# Patient Record
Sex: MALE | Race: WHITE | NOT HISPANIC OR LATINO
[De-identification: names, ages, dates, MRNs, and addresses within clinical notes are randomized per-mention and may not be internally consistent; named-entity substitution may affect disease eponyms.]

---

## 2018-03-26 PROBLEM — Z00.00 ENCOUNTER FOR PREVENTIVE HEALTH EXAMINATION: Status: ACTIVE | Noted: 2018-03-26

## 2018-04-02 ENCOUNTER — APPOINTMENT (OUTPATIENT)
Dept: CARDIOLOGY | Facility: CLINIC | Age: 78
End: 2018-04-02

## 2018-04-25 ENCOUNTER — APPOINTMENT (OUTPATIENT)
Dept: CARDIOLOGY | Facility: CLINIC | Age: 78
End: 2018-04-25

## 2018-04-25 VITALS
SYSTOLIC BLOOD PRESSURE: 143 MMHG | OXYGEN SATURATION: 98 % | BODY MASS INDEX: 26.51 KG/M2 | HEIGHT: 73 IN | HEART RATE: 76 BPM | DIASTOLIC BLOOD PRESSURE: 60 MMHG | TEMPERATURE: 98.4 F | WEIGHT: 200 LBS

## 2018-04-25 DIAGNOSIS — Z86.39 PERSONAL HISTORY OF OTHER ENDOCRINE, NUTRITIONAL AND METABOLIC DISEASE: ICD-10-CM

## 2018-04-25 DIAGNOSIS — Z87.19 PERSONAL HISTORY OF OTHER DISEASES OF THE DIGESTIVE SYSTEM: ICD-10-CM

## 2018-04-25 DIAGNOSIS — M54.12 RADICULOPATHY, CERVICAL REGION: ICD-10-CM

## 2018-04-25 DIAGNOSIS — Z87.09 PERSONAL HISTORY OF OTHER DISEASES OF THE RESPIRATORY SYSTEM: ICD-10-CM

## 2018-04-25 DIAGNOSIS — Z86.79 PERSONAL HISTORY OF OTHER DISEASES OF THE CIRCULATORY SYSTEM: ICD-10-CM

## 2018-04-25 DIAGNOSIS — Z87.438 PERSONAL HISTORY OF OTHER DISEASES OF MALE GENITAL ORGANS: ICD-10-CM

## 2018-04-25 RX ORDER — MULTIVITAMIN
TABLET ORAL
Refills: 0 | Status: ACTIVE | COMMUNITY

## 2018-04-25 RX ORDER — METFORMIN HYDROCHLORIDE 1000 MG/1
1000 TABLET, COATED ORAL TWICE DAILY
Refills: 0 | Status: ACTIVE | COMMUNITY
Start: 2018-04-10

## 2018-04-25 RX ORDER — PNV NO.95/FERROUS FUM/FOLIC AC 28MG-0.8MG
TABLET ORAL
Refills: 0 | Status: ACTIVE | COMMUNITY

## 2018-04-25 RX ORDER — EZETIMIBE 10 MG/1
10 TABLET ORAL
Qty: 90 | Refills: 0 | Status: ACTIVE | COMMUNITY
Start: 2018-01-11

## 2018-05-07 ENCOUNTER — NON-APPOINTMENT (OUTPATIENT)
Age: 78
End: 2018-05-07

## 2018-05-07 PROBLEM — M54.12 CERVICAL RADICULOPATHY: Status: RESOLVED | Noted: 2018-05-07 | Resolved: 2018-05-07

## 2018-05-07 PROBLEM — Z87.09 HISTORY OF ASBESTOSIS: Status: RESOLVED | Noted: 2018-05-07 | Resolved: 2018-05-07

## 2018-05-07 PROBLEM — Z87.19 HISTORY OF DIVERTICULITIS OF COLON: Status: RESOLVED | Noted: 2018-05-07 | Resolved: 2018-05-07

## 2018-05-07 PROBLEM — Z87.438 HISTORY OF ERECTILE DYSFUNCTION: Status: RESOLVED | Noted: 2018-05-07 | Resolved: 2018-05-07

## 2018-05-07 PROBLEM — Z86.79 HISTORY OF CAROTID ARTERY STENOSIS: Status: RESOLVED | Noted: 2018-05-07 | Resolved: 2018-05-07

## 2018-05-07 PROBLEM — Z86.39 HISTORY OF TYPE 2 DIABETES MELLITUS: Status: RESOLVED | Noted: 2018-05-07 | Resolved: 2018-05-07

## 2018-05-07 PROBLEM — Z86.39 HISTORY OF HYPERLIPIDEMIA: Status: RESOLVED | Noted: 2018-05-07 | Resolved: 2018-05-07

## 2018-05-07 PROBLEM — Z86.39 HISTORY OF HYPERKALEMIA: Status: RESOLVED | Noted: 2018-05-07 | Resolved: 2018-05-07

## 2018-05-22 ENCOUNTER — APPOINTMENT (OUTPATIENT)
Dept: CARDIOLOGY | Facility: CLINIC | Age: 78
End: 2018-05-22

## 2018-11-07 ENCOUNTER — RECORD ABSTRACTING (OUTPATIENT)
Age: 78
End: 2018-11-07

## 2018-11-19 ENCOUNTER — RECORD ABSTRACTING (OUTPATIENT)
Age: 78
End: 2018-11-19

## 2018-12-10 ENCOUNTER — APPOINTMENT (OUTPATIENT)
Dept: CARDIOLOGY | Facility: CLINIC | Age: 78
End: 2018-12-10
Payer: MEDICARE

## 2018-12-10 VITALS
SYSTOLIC BLOOD PRESSURE: 148 MMHG | TEMPERATURE: 97.8 F | WEIGHT: 187 LBS | BODY MASS INDEX: 24.78 KG/M2 | DIASTOLIC BLOOD PRESSURE: 56 MMHG | OXYGEN SATURATION: 99 % | HEIGHT: 73 IN | HEART RATE: 48 BPM

## 2018-12-10 DIAGNOSIS — Z86.19 PERSONAL HISTORY OF OTHER INFECTIOUS AND PARASITIC DISEASES: ICD-10-CM

## 2018-12-10 PROCEDURE — 93000 ELECTROCARDIOGRAM COMPLETE: CPT

## 2018-12-10 PROCEDURE — 99215 OFFICE O/P EST HI 40 MIN: CPT

## 2018-12-15 ENCOUNTER — NON-APPOINTMENT (OUTPATIENT)
Age: 78
End: 2018-12-15

## 2018-12-15 PROBLEM — Z86.19 HISTORY OF LYME DISEASE: Status: RESOLVED | Noted: 2018-12-15 | Resolved: 2018-12-15

## 2018-12-15 NOTE — PHYSICAL EXAM
[Auscultation Breath Sounds / Voice Sounds] : lungs were clear to auscultation bilaterally [Heart Rate And Rhythm] : heart rate and rhythm were normal [Heart Sounds] : normal S1 and S2 [Murmurs] : no murmurs present [Abdomen Soft] : soft [Bowel Sounds] : normal bowel sounds [Abnormal Walk] : normal gait [Nail Clubbing] : no clubbing of the fingernails [] : no rash [Affect] : the affect was normal [FreeTextEntry1] : pleasant

## 2018-12-15 NOTE — DISCUSSION/SUMMARY
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 In 5/18 sinus bradycardia with Mobitz type I second-degree AV block was noted. The present tracing demonstrates atrial fibrillation with a controlled-slow ventricular response. As such, atrial fibrillation now may be chronic. Adequate rate control of ventricular response is achieved with the present medical regimen. An elevated "[id number]" score is indicative of an increased risk of systemic/cerebral emboli. Factor X A. inhibitor therapy is indicated. Permanent pacemaker implantation is not warranted in the absence of symptomatic bradycardia or high degree AV block. In view of the patient's age and left atrial enlargement (4.9 cm  5/18 echocardiogram) measures to try and restore and maintain sinus rhythm are unlikely to be successful. The goal of treatment is prevention of emboli and control of the ventricular response.\par \par I have recommended the following:\par 1. Continue present medical regimen\par 2. No further cardiac testing for this problem at this time\par 3. No attempt to restore/maintain sinus rhythm as discussed above\par \par \par Atherosclerotic heart disease\par Daniel has known atherosclerotic heart disease. In 9/07 he underwent coronary artery bypass graft surgery with a robotic-assisted LIMA-LAD. Recurrent symptoms and stenosis of the anastomosis of the LIMA-LAD led to a second operation in 11/07. The operation consisted of a  free LAURE-diagonal/LAD and  saphenous vein grafts to OM1 and OM 3. The right coronary artery was nonobstructive.  A5/18 stress sestamibi study demonstrated only a small area of mild partial anterior apical ischemia.. The resting 12/18 electrocardiogram shows no evidence of myocardial -ischemia or infarction. Left ventricle systolic function is normal as reflected by a left ventricle ejection fraction of 66% on the 7/07 left ventriculogram  67% on a 5/18 echocardiogram  and 67% on the 5/18  gaited sesta mibi study..In view of the lack of symptoms, above noninvasive studies and clinical course continued medical management is indicated at this time.\par \par I have recommended  the following:\par 1 risk factor modification\par 2 continue the present medical regimen\par 3 No further cardiac testing for this problem at this time.\par \par \par Second degree AV block\par The working diagnosis is second-degree AV block Mobitz type I due to atherosclerotic-hypertensive-diabetic heart disease. Beta blocker dose was decreased but not eliminated in response to this diagnosis when detected in 3/16. A 3/16 Holter monitor demonstrated sinus rhythm with periods of atrial fibrillation and no evidence of heart block. Consideration is given towards eliminating or decreasing the dose further of metoprolol. However, the patient has no symptoms and the metoprolol has been helpful to control the ventricular response in atrial fibrillation. Pacemaker implantation is not indicated in the absence of symptomatic bradycardia or high degree AV block.\par \par I have recommended the following:\par 1. Continue the present medical regimen\par \par \par Valvular heart disease\par The 5/18 echocardiogram demonstrated moderate mitral regurgitation and aortic valve sclerosis. Mild pulmonary hypertension was noted with a pulmonary artery systolic pressure of 37 mmHg. The  left atrium was enlarged at 4.9 cm. The left ventricle ejection fraction was 67%. The present cardiac physical examination is not suggestive of severe mitral regurgitation. In view of the lack of symptoms, absence of heart failure and clinical course continued monitoring without intervention is indicated at this time.\par \par I have recommended the following:\par 1 no further cardiac testing for this problem at this time\par \par \par Hypertension\par Hypertension has been severe and difficult to control. Problems with hyperkalemia/renal insufficiency while on lisinopril have prevented the administration of  ACE- I./ARB therapy. Concerns for Mobitz type I second-degree AV block preclude higher doses of beta blockers. Mild elevation of the blood pressure on initial examination today (148/56) may in part be related to a white coat effect.\par The present medical regimen has been well tolerated and effective.\par \par I have recommended the following\par 1. Low-salt low-fat low-cholesterol diabetic diet. Regular aerobic exercise\par 2. Continue the present medical regimen

## 2018-12-15 NOTE — HISTORY OF PRESENT ILLNESS
[FreeTextEntry1] : 78-year-old man\par Routine followup\par Daniel was diagnosed as having lyme  disease and treated with antibiotics. A neurological evaluation was also performed because of concerns for neurological involvement. The details of the evaluation are not available. \par \par Presently Mr. Freire denies chest pain, shortness of breath, palpitations or syncope.

## 2018-12-31 ENCOUNTER — MEDICATION RENEWAL (OUTPATIENT)
Age: 78
End: 2018-12-31

## 2018-12-31 RX ORDER — APIXABAN 5 MG/1
5 TABLET, FILM COATED ORAL
Qty: 180 | Refills: 1 | Status: ACTIVE | COMMUNITY
Start: 2018-03-08 | End: 1900-01-01

## 2019-06-10 ENCOUNTER — APPOINTMENT (OUTPATIENT)
Dept: CARDIOLOGY | Facility: CLINIC | Age: 79
End: 2019-06-10
Payer: MEDICARE

## 2019-06-10 VITALS
BODY MASS INDEX: 24.8 KG/M2 | DIASTOLIC BLOOD PRESSURE: 50 MMHG | SYSTOLIC BLOOD PRESSURE: 120 MMHG | HEART RATE: 46 BPM | WEIGHT: 188 LBS | OXYGEN SATURATION: 97 %

## 2019-06-10 PROCEDURE — 93000 ELECTROCARDIOGRAM COMPLETE: CPT

## 2019-06-10 PROCEDURE — 99215 OFFICE O/P EST HI 40 MIN: CPT

## 2019-06-11 ENCOUNTER — NON-APPOINTMENT (OUTPATIENT)
Age: 79
End: 2019-06-11

## 2019-06-11 NOTE — REVIEW OF SYSTEMS
[Feeling Fatigued] : feeling fatigued [Joint Pain] : joint pain [see HPI] : see HPI [Negative] : Heme/Lymph

## 2019-06-11 NOTE — HISTORY OF PRESENT ILLNESS
[FreeTextEntry1] : 78-year-old man\par Preop cardiovascular examination\par Paresthesias of lower extremities and thenar atrophy of the hands led to MRI studies of the cervical and lumbar spine. Reportedly there was evidence of severe lumbar disc disease and surgical intervention was advised by Dr. Nelson. The details of the evaluation and imaging studies are not available for review.\par \par Mr. Freire denies chest pain, shortness of breath, dizziness, palpitations or syncope.\par \par Daniel presents today for cardiology clearance prior to spine surgery.

## 2019-06-11 NOTE — DISCUSSION/SUMMARY
[FreeTextEntry1] : Preoperative cardiovascular examination\par Paresthesias of lower extremities and thenar atrophy of the hands led to MRI studies of the cervical and lumbar spine. Reportedly there was evidence of severe lumbar disc disease and surgical intervention was advised by Dr. Nelson. The details of the evaluation and imaging studies are not available for review.\par Comment\par There is no cardiac contraindication to spine surgery. There has been no recent myocardial infarction ,there no cardiac related symptoms ,  a 5/18 stress sestamibi study demonstrated only a small area of apical ischemia, left ventricular systolic function is normal reflected by a left ventricular ejection fraction of 67% on the 5/18 echocardiogram , atrial fibrillation ventricular response is controlled and there is no evidence of heart failure. As such, the operation may proceed with acceptable cardiac risk. In an effort to decrease bleeding risk Apixaban may be stopped immediately prior to surgery.\par \par I have recommended the following:\par 1. Await routine preoperative laboratory studies and chest x-ray now present available\par 2 discontinue Apixaban 48 hours prior to surgery with reinstitution as soon as feasible postoperatively as directed by Dr. Nelson\par 3. Continue beta blockers (metoprolol) without interruption in the perioperative period\par 4. Workup and treatment as directed by Dr. Nelson\par 5. 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  In 5/18 sinus bradycardia with Mobitz type I second-degree AV block was noted.  Adequate rate control of ventricular response is achieved with the present medical regimen. An elevated "[id number]" score is indicative of an increased risk of systemic/cerebral emboli. Factor X A. inhibitor therapy is indicated. Permanent pacemaker implantation is not warranted in the absence of symptomatic bradycardia or high degree AV block. In view of the patient's age and left atrial enlargement (4.9 cm  5/18 echocardiogram) measures to try and restore and maintain sinus rhythm are unlikely to be successful. The goal of treatment is prevention of emboli and control of the ventricular response.\par \par I have recommended the following:\par 1. Decrease metoprolol dose to 25 mg/day\par 2. No further cardiac testing for this problem at this time\par 3. No attempt to restore/maintain sinus rhythm as discussed above\par \par \par Atherosclerotic heart disease\par Daniel has known atherosclerotic heart disease. In 9/07 he underwent coronary artery bypass graft surgery with a robotic-assisted LIMA-LAD. Recurrent symptoms and stenosis of the anastomosis of the LIMA-LAD led to a second operation in 11/07. The operation consisted of a  free LAURE-diagonal/LAD and  saphenous vein grafts to OM1 and OM 3. The right coronary artery was nonobstructive.  A5/18 stress sestamibi study demonstrated only a small area of mild partial anterior apical ischemia.. The resting 6/19 electrocardiogram shows no evidence of myocardial -ischemia or infarction. Left ventricle systolic function is normal as reflected by a left ventricle ejection fraction of 66% on the 7/07 left ventriculogram  67% on a 5/18 echocardiogram  and 67% on the 5/18  gaited sesta mibi study..In view of the lack of symptoms, above noninvasive studies and clinical course continued medical management is indicated at this time.\par \par I have recommended  the following:\par 1 risk factor modification\par 2 continue the present medical regimen\par 3 No further cardiac testing for this problem at this time.\par \par \par Second degree AV block\par The working diagnosis is second-degree AV block Mobitz type I due to atherosclerotic-hypertensive-diabetic heart disease. Beta blocker dose was decreased but not eliminated in response to this diagnosis when detected in 3/16. A 3/16 Holter monitor demonstrated sinus rhythm with periods of atrial fibrillation and no evidence of heart block. Consideration is given towards eliminating or decreasing the dose further of metoprolol. However, the patient has no symptoms and the metoprolol has been helpful to control the ventricular response in atrial fibrillation. Pacemaker implantation is not indicated in the absence of symptomatic bradycardia or high degree AV block.\par \par I have recommended the following:\par 1. decrease metoprolol dose to 25 mg/day\par \par \par Valvular heart disease\par The 5/18 echocardiogram demonstrated moderate mitral regurgitation and aortic valve sclerosis. Mild pulmonary hypertension was noted with a pulmonary artery systolic pressure of 37 mmHg. The  left atrium was enlarged at 4.9 cm. The left ventricle ejection fraction was 67%. The present cardiac physical examination is not suggestive of severe mitral regurgitation. In view of the lack of symptoms, absence of heart failure and clinical course continued monitoring without intervention is indicated at this time.\par \par I have recommended the following:\par 1 no further cardiac testing for this problem at this time\par \par \par Hypertension\par Hypertension has been severe and difficult to control. Problems with hyperkalemia/renal insufficiency while on lisinopril have prevented the administration of  ACE- I./ARB therapy. Concerns for Mobitz type I second-degree AV block /bradycardia preclude higher doses of beta blockers. .\par The present medical regimen has been well tolerated and effective.\par \par I have recommended the following\par 1. Low-salt low-fat low-cholesterol diabetic diet. Regular aerobic exercise\par 2. Continue the present medical regimen with reduced metoprolol dose as discussed above

## 2019-06-11 NOTE — PHYSICAL EXAM
[Auscultation Breath Sounds / Voice Sounds] : lungs were clear to auscultation bilaterally [Murmurs] : no murmurs present [Bowel Sounds] : normal bowel sounds [Heart Sounds] : normal S1 and S2 [Abdomen Soft] : soft [Abnormal Walk] : normal gait [Affect] : the affect was normal [Edema] : no peripheral edema present [] : no ischemic changes [FreeTextEntry1] : pleasant

## 2019-12-10 ENCOUNTER — APPOINTMENT (OUTPATIENT)
Dept: CARDIOLOGY | Facility: CLINIC | Age: 79
End: 2019-12-10
Payer: MEDICARE

## 2019-12-10 VITALS
SYSTOLIC BLOOD PRESSURE: 120 MMHG | WEIGHT: 189 LBS | DIASTOLIC BLOOD PRESSURE: 60 MMHG | OXYGEN SATURATION: 99 % | HEART RATE: 40 BPM | BODY MASS INDEX: 24.94 KG/M2

## 2019-12-10 DIAGNOSIS — I27.20 PULMONARY HYPERTENSION, UNSPECIFIED: ICD-10-CM

## 2019-12-10 DIAGNOSIS — I44.1 ATRIOVENTRICULAR BLOCK, SECOND DEGREE: ICD-10-CM

## 2019-12-10 PROCEDURE — 93225 XTRNL ECG REC<48 HRS REC: CPT

## 2019-12-10 PROCEDURE — 99215 OFFICE O/P EST HI 40 MIN: CPT

## 2019-12-10 PROCEDURE — 93000 ELECTROCARDIOGRAM COMPLETE: CPT | Mod: 59

## 2019-12-12 ENCOUNTER — NON-APPOINTMENT (OUTPATIENT)
Age: 79
End: 2019-12-12

## 2019-12-12 PROBLEM — I44.1 MOBITZ TYPE 1 SECOND DEGREE ATRIOVENTRICULAR BLOCK: Status: ACTIVE | Noted: 2018-12-15

## 2019-12-12 NOTE — PHYSICAL EXAM
[Heart Sounds] : normal S1 and S2 [Auscultation Breath Sounds / Voice Sounds] : lungs were clear to auscultation bilaterally [Murmurs] : no murmurs present [Edema] : no peripheral edema present [Bowel Sounds] : normal bowel sounds [Abdomen Soft] : soft [Abnormal Walk] : normal gait [] : no rash [Affect] : the affect was normal [FreeTextEntry1] : pleasant

## 2019-12-12 NOTE — HISTORY OF PRESENT ILLNESS
[FreeTextEntry1] : 79-year-old man\par Routine followup\par "I feel okay" Daniel denies chest pain, palpitations, shortness of breath, dizziness or syncope. He is under stress due to  concern for his daughter with a recent diagnosis of breast cancer awaiting chemotherapy.

## 2019-12-12 NOTE — DISCUSSION/SUMMARY
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  In 5/18 sinus bradycardia with Mobitz type I second-degree AV block was noted.  Marked reduction of the heart rate is in part related to the administration of beta blockers ( metoprolol) and AV brigid sclerosis.  An elevated "[id number]" score is indicative of an increased risk of systemic/cerebral emboli. Factor X A. inhibitor therapy is indicated. Permanent pacemaker implantation is not warranted in the absence of symptomatic bradycardia or high degree AV block.  The goal of treatment is prevention of emboli and control of the ventricular response.\par \par I have recommended the following:\par 1. Discontinue metoprolol\par 2. No further cardiac testing for this problem at this time\par 3. Zio patch/event recorder with next office evaluation in 6 months\par \par \par \par Atherosclerotic heart disease\par Daniel has known atherosclerotic heart disease. In 9/07 he underwent coronary artery bypass graft surgery with a robotic-assisted LIMA-LAD. Recurrent symptoms and stenosis of the anastomosis of the LIMA-LAD led to a second operation in 11/07. The operation consisted of a  free LAURE-diagonal/LAD and  saphenous vein grafts to OM1 and OM 3. The right coronary artery was nonobstructive.  A5/18 stress sestamibi study demonstrated only a small area of mild partial anterior apical ischemia.. The resting 6/19 electrocardiogram shows no evidence of myocardial -ischemia or infarction. Left ventricle systolic function is normal as reflected by a left ventricle ejection fraction of 66% on the 7/07 left ventriculogram  67% on a 5/18 echocardiogram  and 67% on the 5/18  gaited sesta mibi study..In view of the lack of symptoms, above noninvasive studies and clinical course continued medical management is indicated at this time.\par \par I have recommended  the following:\par 1 risk factor modification\par 2 continue the present medical regimen\par 3 No further cardiac testing for this problem at this time.\par 4 echocardiogram with next office evaluation in 6 months\par \par \par Second degree AV block\par The working diagnosis is second-degree AV block Mobitz type I due to atherosclerotic -hypertensive-diabetic heart disease. Beta blocker dose was decreased but not eliminated in response to this diagnosis when detected in 3/16. A 3/16 Holter monitor demonstrated sinus rhythm with periods of atrial fibrillation and no evidence of heart block.  Pacemaker implantation is not indicated in the absence of symptomatic bradycardia or high degree AV block. The present low heart rate (40/minute)  is in part due to the administration of beta blockers ( metoprolol)\par \par I have recommended the following:\par 1.discontinue metoprolol .\par 2 Zio  patch/event recorder with next office evaluation in  6 months\par \par \par Valvular heart disease\par The 5/18 echocardiogram demonstrated moderate mitral regurgitation and aortic valve sclerosis. Mild pulmonary hypertension was noted with a pulmonary artery systolic pressure of 37 mmHg. The  left atrium was enlarged at 4.9 cm. The left ventricle ejection fraction was 67%. The present cardiac physical examination is not suggestive of severe mitral regurgitation. In view of the lack of symptoms, absence of heart failure and clinical course continued monitoring without intervention is indicated at this time.\par \par I have recommended the following:\par 1 no further cardiac testing for this problem at this time\par 2 echocardiogram with next office evaluation in  6 months\par \par \par Hypertension\par Hypertension has been severe and difficult to control. Problems with hyperkalemia/renal insufficiency while on lisinopril have prevented the administration of  ACE- I./ARB therapy. Concerns for Mobitz type I second-degree AV block /bradycardia preclude  beta blockers. .\par The present medical regimen has been well tolerated and effective.\par \par I have recommended the following\par 1. Low-salt low-fat low-cholesterol diabetic diet. Regular aerobic exercise\par 2. Continue the present medical regimen with reduced metoprolol dose as discussed above

## 2020-07-21 ENCOUNTER — APPOINTMENT (OUTPATIENT)
Dept: CARDIOLOGY | Facility: CLINIC | Age: 80
End: 2020-07-21
Payer: MEDICARE

## 2020-07-21 DIAGNOSIS — I34.0 NONRHEUMATIC MITRAL (VALVE) INSUFFICIENCY: ICD-10-CM

## 2020-07-21 PROCEDURE — 93225 XTRNL ECG REC<48 HRS REC: CPT

## 2020-07-21 PROCEDURE — 93306 TTE W/DOPPLER COMPLETE: CPT

## 2020-07-21 PROCEDURE — 93000 ELECTROCARDIOGRAM COMPLETE: CPT | Mod: 59

## 2020-07-21 PROCEDURE — ZZZZZ: CPT

## 2020-08-02 DIAGNOSIS — Z86.79 PERSONAL HISTORY OF OTHER DISEASES OF THE CIRCULATORY SYSTEM: ICD-10-CM

## 2020-08-02 PROCEDURE — 93227 XTRNL ECG REC<48 HR R&I: CPT

## 2020-08-10 ENCOUNTER — APPOINTMENT (OUTPATIENT)
Dept: CARDIOLOGY | Facility: CLINIC | Age: 80
End: 2020-08-10
Payer: MEDICARE

## 2020-08-10 ENCOUNTER — NON-APPOINTMENT (OUTPATIENT)
Age: 80
End: 2020-08-10

## 2020-08-10 VITALS
OXYGEN SATURATION: 99 % | WEIGHT: 182 LBS | BODY MASS INDEX: 24.01 KG/M2 | HEART RATE: 52 BPM | SYSTOLIC BLOOD PRESSURE: 124 MMHG | DIASTOLIC BLOOD PRESSURE: 80 MMHG | TEMPERATURE: 98.4 F

## 2020-08-10 PROCEDURE — 93000 ELECTROCARDIOGRAM COMPLETE: CPT | Mod: NC

## 2020-08-10 PROCEDURE — 99215 OFFICE O/P EST HI 40 MIN: CPT

## 2020-08-11 NOTE — PHYSICAL EXAM
[Auscultation Breath Sounds / Voice Sounds] : lungs were clear to auscultation bilaterally [Heart Sounds] : normal S1 and S2 [Murmurs] : no murmurs present [Bowel Sounds] : normal bowel sounds [Abdomen Soft] : soft [Abnormal Walk] : normal gait [] : no rash [Affect] : the affect was normal [Abdomen Tenderness] : non-tender [FreeTextEntry1] : feet warm, well perfused. no edema. no ulcerations

## 2020-08-11 NOTE — HISTORY OF PRESENT ILLNESS
[FreeTextEntry1] : 80-year-old man\par Routine followup\par \par Preoperative cardiovascular examination.\par Hematuria led to a 8/20 abdominal pelvic ultrasound. The study showed evidence of bladder masses. Further evaluation by cystoscopy was advised. Mr. Freire is awaiting urological evaluation by Dr. Foy. Urologic consultation /cystoscopy is anticipated .\par \par Mr. Freire denies chest pain, palpitations, shortness of breath at rest or syncope.

## 2020-08-11 NOTE — DISCUSSION/SUMMARY
[FreeTextEntry1] : Preoperative cardiovascular examination.\par Hematuria led to a 8/20 abdominal pelvic ultrasound. The study showed evidence of bladder masses. Further evaluation by cystoscopy was advised. Mr. Freire is awaiting urological evaluation by Dr. Foy. Cystoscopy is anticipated .\par \par \par Comment\par There is no cardiac contraindication to urological surgery/cystoscopy. There is no history of a myocardial infarction. There are no cardiac related symptoms. The patient hemodynamically stable. Left ventricular systolic function is normal as reflected by a left ventricular ejection fraction of 62% on the 8/20 echocardiogram.   A 5/18 stress sestamibi study demonstrated only a small area of mild apical ischemia with a left ventricular ejection fraction 67% The resting preoperative 8/20  electrocardiogram shows no evidence of myocardial ischemia or infarction. Atrial fibrillation ventricular response is adequately controlled. As such, cystoscopy / urological surgery (if required)  may proceed with acceptable cardiac risk. One of the main clinical decisions involves anticoagulation in the perioperative period. In an effort to decrease potential perioperative bleeding complications apixaban  may  be discontinued immediately preoperatively and reinstituted as soon as feasible postoperatively.\par \par I recommended the following:\par 1 await routine  preoperative laboratory studies not currently available\par 2 continue present medical regimen\par 3 discontinue apixaban  48 hours prior to the procedure with reinstitution as soon as feasible postoperatively as directed by urology/Dr. 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  In 5/18 sinus bradycardia with Mobitz type I second-degree AV block was noted. Marked bradycardia  (40/minute) led to discontinuation of all AV brigid blocking agents/beta blockers/metoprolol in 12/19.. A 7/20 Zio patch/mobile telemetry study demonstrated atrial fibrillation with rate variation /minute and an average of 49/minute. There were  no symptoms. The control to slow ventricular response in atrial fibrillation in the absence of any AV brigid blocking agent is consistent with a diagnosis of AV brigid sclerosis. Permanent pacemaker implantation is not indicated in the absence of symptomatic bradycardia or high degree AV block. An elevated "NANCY [id number]" score is indicative of an increased risk of systemic/cerebral emboli. Factor Xa . inhibitor therapy is indicated. \par \par I have recommended the following:\par 1. Continue the present medical regimenl\par 2. No further cardiac testing for this problem at this time\par \par \par \par \par Atherosclerotic heart disease\par Daniel has known atherosclerotic heart disease. In 9/07 he underwent coronary artery bypass graft surgery with a robotic-assisted LIMA-LAD. Recurrent symptoms and stenosis of the anastomosis of the LIMA-LAD led to a second operation in 11/07. The operation consisted of a  free LAURE-diagonal/LAD and  saphenous vein grafts to OM1 and OM 3. The right coronary artery was nonobstructive.  A5/18 stress sestamibi study demonstrated only a small area of mild partial anterior apical ischemia.. The resting  8/20  electrocardiogram shows no evidence of myocardial -ischemia or infarction. Left ventricle systolic function is normal as reflected by a left ventricle ejection fraction of 66% on the 7/07 left ventriculogram   67% on the 5/18  gaited sesta mibi study and 61 % on the  7/20 echocardiogram..In view of the lack of symptoms, above noninvasive studies and clinical course continued medical management is indicated at this time.\par \par I have recommended  the following:\par 1 risk factor modification\par 2 continue the present medical regimen\par 3 No further cardiac testing for this problem at this time.\par \par \par \par \par \par \par Valvular heart disease\par The 7/20  echocardiogram demonstrated mild mitral regurgitation and aortic valve sclerosis. Mild pulmonary hypertension was noted with a pulmonary artery systolic pressure of 45 mmHg. The  left atrium was enlarged at 5.4 cm. The left ventricle ejection fraction was 61%. The present cardiac physical examination is not suggestive of severe mitral regurgitation. In view of the lack of symptoms, absence of heart failure and clinical course continued monitoring without intervention is indicated at this time.\par \par I have recommended the following:\par 1 no further cardiac testing for this problem at this time\par \par \par \par Hypertension\par Hypertension has been severe and difficult to control. Problems with hyperkalemia/renal insufficiency while on lisinopril have prevented the administration of  ACE- I./ARB therapy. Concerns for prior Mobitz type I second-degree AV block /bradycardia preclude  beta blockers. .\par The present medical regimen has been well tolerated and effective.\par \par I have recommended the following\par 1. Low-salt low-fat low-cholesterol diabetic diet. Regular aerobic exercise\par 2. Continue the present medical regimen\par \par \par \par Hyperlipidemia\par Hyperlipidemia represents a risk factor for progressive atherosclerotic disease. Target LDL level with known atherosclerotic heart disease is about 70. Intolerance of HMG coA reductase inhibitor therapy has prevented its administration. In 7/20 the serum cholesterol level was 167 triglycerides 76 HDL 56 and . Recent data has indicated the benefit of PCSK9 inhibition therapy in patients were statin therapy will was either ineffective or unable to be tolerated. \par Nonpharmacological therapy, specifically diet and exercise are emphasized his major aspects of treatment.\par \par \par I have recommended the following: On\par 1. Low-salt low-fat low-cholesterol diet. Regular aerobic exercise\par 2 continue the present medical regimen\par 3 strong consideration for PCSK9 inhibition therapy dependent on followup lipid levels as discussed above\par 4. Routine laboratory studies including lipid profile through primary care doctor Felix\par \par \par \par The diagnosis, prognosis, risks, options and alternatives were explained at length to the patient. All questions were answered. Issues discussed included atherosclerotic heart disease, hyperlipidemia, hypertension preoperative cardiovascular examination, perioperative anticoagulation and diet/exercise.\par \par \par \par Counseling and/or coordination of care\par Time was a significant factor for this patient encounter. Total time spent with the patient was 40 minutes. 50% of the time was devoted to counseling and/or coordination of care

## 2020-08-26 ENCOUNTER — APPOINTMENT (OUTPATIENT)
Dept: CARDIOLOGY | Facility: CLINIC | Age: 80
End: 2020-08-26
Payer: MEDICARE

## 2020-08-26 PROCEDURE — G2012 BRIEF CHECK IN BY MD/QHP: CPT

## 2020-12-19 ENCOUNTER — NON-APPOINTMENT (OUTPATIENT)
Age: 80
End: 2020-12-19

## 2021-01-01 ENCOUNTER — APPOINTMENT (OUTPATIENT)
Dept: CARDIOLOGY | Facility: CLINIC | Age: 81
End: 2021-01-01

## 2021-01-01 ENCOUNTER — APPOINTMENT (OUTPATIENT)
Dept: CARDIOLOGY | Facility: CLINIC | Age: 81
End: 2021-01-01
Payer: MEDICARE

## 2021-01-01 ENCOUNTER — NON-APPOINTMENT (OUTPATIENT)
Age: 81
End: 2021-01-01

## 2021-01-01 VITALS
HEIGHT: 73 IN | SYSTOLIC BLOOD PRESSURE: 151 MMHG | DIASTOLIC BLOOD PRESSURE: 60 MMHG | OXYGEN SATURATION: 99 % | TEMPERATURE: 98.6 F | WEIGHT: 166 LBS | HEART RATE: 50 BPM | BODY MASS INDEX: 22 KG/M2

## 2021-01-01 DIAGNOSIS — I10 ESSENTIAL (PRIMARY) HYPERTENSION: ICD-10-CM

## 2021-01-01 DIAGNOSIS — U07.1 COVID-19: ICD-10-CM

## 2021-01-01 PROCEDURE — 93000 ELECTROCARDIOGRAM COMPLETE: CPT

## 2021-01-01 PROCEDURE — 99213 OFFICE O/P EST LOW 20 MIN: CPT

## 2021-01-01 PROCEDURE — 99442: CPT | Mod: 95

## 2021-02-10 ENCOUNTER — NON-APPOINTMENT (OUTPATIENT)
Age: 81
End: 2021-02-10

## 2021-02-10 ENCOUNTER — APPOINTMENT (OUTPATIENT)
Dept: CARDIOLOGY | Facility: CLINIC | Age: 81
End: 2021-02-10
Payer: MEDICARE

## 2021-02-10 VITALS
HEIGHT: 73 IN | TEMPERATURE: 97.8 F | SYSTOLIC BLOOD PRESSURE: 126 MMHG | WEIGHT: 161 LBS | OXYGEN SATURATION: 96 % | HEART RATE: 52 BPM | BODY MASS INDEX: 21.34 KG/M2 | DIASTOLIC BLOOD PRESSURE: 68 MMHG

## 2021-02-10 DIAGNOSIS — R09.89 OTHER SPECIFIED SYMPTOMS AND SIGNS INVOLVING THE CIRCULATORY AND RESPIRATORY SYSTEMS: ICD-10-CM

## 2021-02-10 PROCEDURE — 93000 ELECTROCARDIOGRAM COMPLETE: CPT

## 2021-02-10 PROCEDURE — 99213 OFFICE O/P EST LOW 20 MIN: CPT

## 2021-02-13 NOTE — DISCUSSION/SUMMARY
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  In 5/18 sinus bradycardia with Mobitz type I second-degree AV block was noted. Marked bradycardia  (40/minute) led to discontinuation of all AV brigid blocking agents/beta blockers/metoprolol in 12/19.. A 7/20 Zio patch/mobile telemetry study demonstrated atrial fibrillation with rate variation /minute and an average of 49/minute. There were  no symptoms. The control to slow ventricular response in atrial fibrillation in the absence of any AV brigid blocking agent is consistent with a diagnosis of AV brigid sclerosis. Permanent pacemaker implantation is not indicated in the absence of symptomatic bradycardia or high degree AV block. An elevated "NANCY [id number]" score is indicative of an increased risk of systemic/cerebral emboli. Factor Xa . inhibitor therapy is indicated. \par Apixaban and aspirin have been  stopped in response to hematuria which has resolved at this time\par \par I have recommended the following:\par 1. Restart apixaban and aspirin\par 2. No further cardiac testing for this problem at this time\par \par \par \par \par Atherosclerotic heart disease\mayur Sanchez has known atherosclerotic heart disease. In 9/07 he underwent coronary artery bypass graft surgery with a robotic-assisted LIMA-LAD. Recurrent symptoms and stenosis of the anastomosis of the LIMA-LAD led to a second operation in 11/07. The operation consisted of a  free LAURE-diagonal/LAD and  saphenous vein grafts to OM1 and OM 3. The right coronary artery was nonobstructive.  A5/18 stress sestamibi study demonstrated only a small area of mild partial anterior apical ischemia.. The resting  2/21  electrocardiogram shows no evidence of myocardial -ischemia or infarction. Left ventricle systolic function is normal as reflected by a left ventricle ejection fraction of 66% on the 7/07 left ventriculogram   67% on the 5/18  gaited sesta mibi study and 61 % on the  7/20 echocardiogram..In view of the lack of symptoms, above noninvasive studies and clinical course continued medical management is indicated at this time.\par \par I have recommended  the following:\par 1 risk factor modification\par 2 continue the present medical regimen\par 3 No further cardiac testing for this problem at this time.\par \par \par \par \par \par \par Valvular heart disease\par The 7/20  echocardiogram demonstrated mild mitral regurgitation and aortic valve sclerosis. Mild pulmonary hypertension was noted with a pulmonary artery systolic pressure of 45 mmHg. The  left atrium was enlarged at 5.4 cm. The left ventricle ejection fraction was 61%. The present cardiac physical examination is not suggestive of severe mitral regurgitation. In view of the lack of symptoms, absence of heart failure and clinical course continued monitoring without intervention is indicated at this time.\par \par I have recommended the following:\par 1 no further cardiac testing for this problem at this time\par \par \par \par Hypertension\par Hypertension has been severe and difficult to control. Problems with hyperkalemia/renal insufficiency while on lisinopril have prevented the administration of  ACE- I./ARB therapy. Concerns for prior Mobitz type I second-degree AV block /bradycardia preclude  beta blockers. .\par The present medical regimen has been well tolerated and effective.\par \par I have recommended the following\par 1. Low-salt low-fat low-cholesterol diabetic diet. Regular aerobic exercise\par 2. Continue the present medical regimen\par \par \par \par Hyperlipidemia\par Hyperlipidemia represents a risk factor for progressive atherosclerotic disease. Target LDL level with known atherosclerotic heart disease is about 70. Intolerance of HMG coA reductase inhibitor therapy has prevented its administration. In 7/20 the serum cholesterol level was 167 triglycerides 76 HDL 56 and . Recent data has indicated the benefit of PCSK9 inhibition therapy in patients in whom  statin therapy was either ineffective or  not tolerated. \par Nonpharmacological therapy, specifically diet and exercise are emphasized  as  major aspects of treatment.\par \par \par I have recommended the following:\par 1. Low-salt low-fat low-cholesterol diet. Regular aerobic exercise\par 2 continue the present medical regimen\par 3 strong consideration for PCSK9 inhibition therapy dependent on followup lipid levels as discussed above\par 4. Routine laboratory studies including lipid profile through primary care doctor Felix\par \par \par \par Carotid stenosis\par In 6/13  Daniel  underwent a right carotid endarterectomy for  a  high-grade stenosis. He has been followed by Dr. Ruano /vascular surgery with regular ultrasound studies. A left carotid bruit audible today was not appreciated on the previous physical examination. The last ultrasound study available at this time is an  8/16 study demonstrating a patent internal right carotid artery. The left carotid artery had plaque without an obstructing lesion.\par \par I have  recommended the following\par a. Vascular surgical followup Dr. Ruano with anticipation of an updated carotid artery ultrasound/duplex study\par \par \par \par The diagnosis, prognosis, risks, options and alternatives were explained at length to the patient. All questions were answered. Issues discussed included atherosclerotic heart disease, hyperlipidemia, hypertension ,carotid stenosis cardioembolic stroke hyperlipidemia  anticoagulation  noninvasive cardiac peripheral arterial testing and diet/exercise.\par \par \par \par Counseling and/or coordination of care\par Time was a significant factor for this patient encounter. Total time spent with the patient was 25  minutes. 50% of the time was devoted to counseling and/or coordination of care

## 2021-02-13 NOTE — PHYSICAL EXAM
[Auscultation Breath Sounds / Voice Sounds] : lungs were clear to auscultation bilaterally [Heart Sounds] : normal S1 and S2 [Murmurs] : no murmurs present [Bowel Sounds] : normal bowel sounds [Abdomen Soft] : soft [Abdomen Tenderness] : non-tender [Abnormal Walk] : normal gait [] : no rash [Affect] : the affect was normal [FreeTextEntry1] : feet warm, well perfused. no edema. no ulcerations

## 2021-02-13 NOTE — HISTORY OF PRESENT ILLNESS
[FreeTextEntry1] : 80-year-old man\par Routine followup\par Daniel has undergone urological surgery and followup treatment for bladder cancer. There have been no reported cardiovascular complications. However he has had episodes of hematuria at which times he has discontinued apxiaban and aspirin .\par \par No chest pain. No palpitations. No shortness of breath. No syncope. He has lost 21 pounds in the last 6 months

## 2021-09-22 PROBLEM — I10 HYPERTENSION, UNSPECIFIED TYPE: Status: ACTIVE | Noted: 2018-05-07

## 2021-09-23 PROBLEM — U07.1 COVID-19 VIRUS INFECTION: Status: RESOLVED | Noted: 2021-01-01 | Resolved: 2021-01-01

## 2021-09-23 NOTE — REVIEW OF SYSTEMS
[Feeling Fatigued] : feeling fatigued [Hearing Loss] : hearing loss [Joint Pain] : joint pain [Negative] : Heme/Lymph [FreeTextEntry5] : see history of present illness [FreeTextEntry8] : bladder cancer

## 2021-09-23 NOTE — HISTORY OF PRESENT ILLNESS
[FreeTextEntry1] : 81-year-old man\par Routine followup\par \par Daniel denies chest pain, palpitations, shortness of breath dizziness or syncope. His main complaint is that of ongoing hematuria with slight dysuria.

## 2021-09-23 NOTE — DISCUSSION/SUMMARY
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  In 5/18 sinus bradycardia with Mobitz type I second-degree AV block was noted. Marked bradycardia  (40/minute) led to discontinuation of all AV brigid blocking agents/beta blockers/metoprolol in 12/19.. A 7/20 Zio patch/mobile telemetry study demonstrated atrial fibrillation with rate variation /minute and an average of 49/minute. There were  no symptoms. The control to slow ventricular response in atrial fibrillation in the absence of any AV brigid blocking agent is consistent with a diagnosis of AV brigid sclerosis. Permanent pacemaker implantation is not indicated in the absence of symptomatic bradycardia or high degree AV block. An elevated "NANCY [id number]" score is indicative of an increased risk of systemic/cerebral emboli. Factor Xa . inhibitor therapy is indicated. \par \par \par I have recommended the following:\par 1.No further cardiac testing for this problem at this time\par 2 continue the present medical regimen \par \par \par \par \par \par Atherosclerotic heart disease\par Daniel has known atherosclerotic heart disease. In 9/07 he underwent coronary artery bypass graft surgery with a robotic-assisted LIMA-LAD. Recurrent symptoms and stenosis of the anastomosis of the LIMA-LAD led to a second operation in 11/07. The operation consisted of a  free LAURE-diagonal/LAD and  saphenous vein grafts to OM1 and OM 3. The right coronary artery was nonobstructive.  A5/18 stress sestamibi study demonstrated only a small area of mild partial anterior apical ischemia.. The resting  9/21  electrocardiogram shows no evidence of myocardial -ischemia or infarction. Left ventricle systolic function is normal as reflected by a left ventricle ejection fraction of 66% on the 7/07 left ventriculogram   67% on the 5/18  gaited sesta mibi study and 61 % on the  7/20 echocardiogram..In view of the lack of symptoms, above noninvasive studies and clinical course continued medical management is indicated at this time.\par \par I have recommended  the following:\par 1 risk factor modification\par 2 continue the present medical regimen\par 3 No further cardiac testing for this problem at this time.\par \par \par \par \par \par \par Valvular heart disease\par The 7/20  echocardiogram demonstrated mild mitral regurgitation and aortic valve sclerosis. Mild pulmonary hypertension was noted with a pulmonary artery systolic pressure of 45 mmHg. The  left atrium was enlarged at 5.4 cm. The left ventricle ejection fraction was 61%. The present cardiac physical examination is not suggestive of severe mitral regurgitation. In view of the lack of symptoms, absence of heart failure and clinical course continued monitoring without intervention is indicated at this time.\par \par I have recommended the following:\par 1 no further cardiac testing for this problem at this time\par \par \par \par \par \par Hypertension\par Hypertension has been severe and difficult to control. Problems with hyperkalemia/renal insufficiency while on lisinopril have prevented the administration of  ACE- I./ARB therapy. Concerns for prior Mobitz type I second-degree AV block /bradycardia preclude  beta blockers. .\par The present medical regimen has been well tolerated and effective.  Elevation of blood pressure on initial examination today  (150/60 mmHg) is attributed to a white coat effect\par \par I have recommended the following\par 1. Low-salt low-fat low-cholesterol diabetic diet. Regular aerobic exercise\par 2. Continue the present medical regimen\par \par \par \par Hyperlipidemia\par Hyperlipidemia represents a risk factor for progressive atherosclerotic disease. Target LDL level with known atherosclerotic heart disease is about 70. Intolerance of HMG coA reductase inhibitor therapy has prevented its administration. In 7/20 the serum cholesterol level was 167 triglycerides 76 HDL 56 and . Recent data has indicated the benefit of PCSK9 inhibition therapy in patients in whom  statin therapy was either ineffective or  not tolerated. \par Nonpharmacological therapy, specifically diet and exercise are emphasized  as  major aspects of treatment.\par \par \par I have recommended the following:\par 1. Low-salt low-fat low-cholesterol diet. Regular aerobic exercise\par 2 continue the present medical regimen\par 3 strong consideration for PCSK9 inhibition therapy dependent on followup lipid levels as discussed above\par 4. Routine laboratory studies including lipid profile through primary care doctor Felix\par \par \par \par Carotid stenosis\par In 6/13  Daniel  underwent a right carotid endarterectomy for  a  high-grade stenosis. He has been followed by Dr. Ruano /vascular surgery with regular ultrasound studies. A left carotid bruit audible today was not appreciated on the previous physical examination. The last ultrasound study available at this time is an  8/16 study demonstrating a patent internal right carotid artery. The left carotid artery had plaque without an obstructing lesion.\par \par I have  recommended the following\par a. Vascular surgical followup Dr. Ruano with anticipation of an updated carotid artery ultrasound/duplex study\par \par \par \par The diagnosis, prognosis, risks, options and alternatives were explained at length to the patient. All questions were answered. Issues discussed included atherosclerotic heart disease, hyperlipidemia, hypertension ,carotid stenosis cardioembolic stroke hyperlipidemia  anticoagulation  noninvasive cardiac peripheral arterial testing and diet/exercise.\par \par \par \par Counseling and/or coordination of care\par Time was a significant factor for this patient encounter. Total time spent with the patient was 25  minutes. 50% of the time was devoted to counseling and/or coordination of care

## 2021-09-23 NOTE — PHYSICAL EXAM
[Auscultation Breath Sounds / Voice Sounds] : lungs were clear to auscultation bilaterally [Heart Sounds] : normal S1 and S2 [Murmurs] : no murmurs present [Bowel Sounds] : normal bowel sounds [Abdomen Soft] : soft [Abdomen Tenderness] : non-tender [Abnormal Walk] : normal gait [] : no rash [Affect] : the affect was normal [FreeTextEntry1] : pleasant

## 2022-01-01 ENCOUNTER — TRANSCRIPTION ENCOUNTER (OUTPATIENT)
Age: 82
End: 2022-01-01

## 2022-01-01 ENCOUNTER — RESULT REVIEW (OUTPATIENT)
Age: 82
End: 2022-01-01

## 2022-01-01 ENCOUNTER — NON-APPOINTMENT (OUTPATIENT)
Age: 82
End: 2022-01-01

## 2022-01-01 ENCOUNTER — APPOINTMENT (OUTPATIENT)
Dept: BARIATRICS/WEIGHT MGMT | Facility: CLINIC | Age: 82
End: 2022-01-01

## 2022-01-01 ENCOUNTER — APPOINTMENT (OUTPATIENT)
Dept: HEART AND VASCULAR | Facility: CLINIC | Age: 82
End: 2022-01-01

## 2022-01-01 ENCOUNTER — APPOINTMENT (OUTPATIENT)
Dept: CARE COORDINATION | Facility: HOME HEALTH | Age: 82
End: 2022-01-01
Payer: MEDICARE

## 2022-01-01 ENCOUNTER — APPOINTMENT (OUTPATIENT)
Dept: GASTROENTEROLOGY | Facility: CLINIC | Age: 82
End: 2022-01-01
Payer: MEDICARE

## 2022-01-01 ENCOUNTER — APPOINTMENT (OUTPATIENT)
Dept: GASTROENTEROLOGY | Facility: HOSPITAL | Age: 82
End: 2022-01-01

## 2022-01-01 ENCOUNTER — APPOINTMENT (OUTPATIENT)
Dept: VASCULAR SURGERY | Facility: CLINIC | Age: 82
End: 2022-01-01

## 2022-01-01 ENCOUNTER — APPOINTMENT (OUTPATIENT)
Dept: CARDIOLOGY | Facility: CLINIC | Age: 82
End: 2022-01-01

## 2022-01-01 ENCOUNTER — INPATIENT (INPATIENT)
Facility: HOSPITAL | Age: 82
LOS: 3 days | Discharge: HOME | DRG: 226 | End: 2022-04-08
Attending: INTERNAL MEDICINE | Admitting: INTERNAL MEDICINE
Payer: MEDICARE

## 2022-01-01 ENCOUNTER — APPOINTMENT (OUTPATIENT)
Dept: HEART AND VASCULAR | Facility: CLINIC | Age: 82
End: 2022-01-01
Payer: MEDICARE

## 2022-01-01 ENCOUNTER — APPOINTMENT (OUTPATIENT)
Dept: CARDIOLOGY | Facility: CLINIC | Age: 82
End: 2022-01-01
Payer: MEDICARE

## 2022-01-01 VITALS
OXYGEN SATURATION: 98 % | BODY MASS INDEX: 20.18 KG/M2 | HEIGHT: 72 IN | SYSTOLIC BLOOD PRESSURE: 112 MMHG | WEIGHT: 149 LBS | DIASTOLIC BLOOD PRESSURE: 53 MMHG | HEART RATE: 63 BPM

## 2022-01-01 VITALS
OXYGEN SATURATION: 96 % | TEMPERATURE: 98 F | HEART RATE: 63 BPM | DIASTOLIC BLOOD PRESSURE: 54 MMHG | RESPIRATION RATE: 18 BRPM | HEIGHT: 72.01 IN | SYSTOLIC BLOOD PRESSURE: 101 MMHG | WEIGHT: 138.23 LBS

## 2022-01-01 VITALS
HEART RATE: 70 BPM | OXYGEN SATURATION: 97 % | SYSTOLIC BLOOD PRESSURE: 110 MMHG | BODY MASS INDEX: 19.39 KG/M2 | DIASTOLIC BLOOD PRESSURE: 60 MMHG | HEIGHT: 72 IN | WEIGHT: 143.2 LBS

## 2022-01-01 VITALS
WEIGHT: 149 LBS | DIASTOLIC BLOOD PRESSURE: 52 MMHG | HEART RATE: 54 BPM | SYSTOLIC BLOOD PRESSURE: 112 MMHG | BODY MASS INDEX: 20.18 KG/M2 | HEIGHT: 72 IN

## 2022-01-01 VITALS — TEMPERATURE: 98 F

## 2022-01-01 VITALS
HEART RATE: 49 BPM | OXYGEN SATURATION: 99 % | DIASTOLIC BLOOD PRESSURE: 58 MMHG | RESPIRATION RATE: 16 BRPM | SYSTOLIC BLOOD PRESSURE: 115 MMHG

## 2022-01-01 VITALS
BODY MASS INDEX: 19.53 KG/M2 | HEART RATE: 53 BPM | SYSTOLIC BLOOD PRESSURE: 96 MMHG | WEIGHT: 144 LBS | DIASTOLIC BLOOD PRESSURE: 40 MMHG

## 2022-01-01 DIAGNOSIS — I11.0 HYPERTENSIVE HEART DISEASE WITH HEART FAILURE: ICD-10-CM

## 2022-01-01 DIAGNOSIS — Z82.5 FAMILY HISTORY OF ASTHMA AND OTHER CHRONIC LOWER RESPIRATORY DISEASES: ICD-10-CM

## 2022-01-01 DIAGNOSIS — Z98.49 CATARACT EXTRACTION STATUS, UNSPECIFIED EYE: Chronic | ICD-10-CM

## 2022-01-01 DIAGNOSIS — E11.51 TYPE 2 DIABETES MELLITUS WITH DIABETIC PERIPHERAL ANGIOPATHY WITHOUT GANGRENE: ICD-10-CM

## 2022-01-01 DIAGNOSIS — K63.5 POLYP OF COLON: ICD-10-CM

## 2022-01-01 DIAGNOSIS — D64.9 ANEMIA, UNSPECIFIED: ICD-10-CM

## 2022-01-01 DIAGNOSIS — Z98.890 OTHER SPECIFIED POSTPROCEDURAL STATES: Chronic | ICD-10-CM

## 2022-01-01 DIAGNOSIS — Z12.11 ENCOUNTER FOR SCREENING FOR MALIGNANT NEOPLASM OF COLON: ICD-10-CM

## 2022-01-01 DIAGNOSIS — Z79.01 LONG TERM (CURRENT) USE OF ANTICOAGULANTS: ICD-10-CM

## 2022-01-01 DIAGNOSIS — K57.30 DIVERTICULOSIS OF LARGE INTESTINE W/OUT PERFORATION OR ABSCESS W/OUT BLEEDING: ICD-10-CM

## 2022-01-01 DIAGNOSIS — Z79.84 LONG TERM (CURRENT) USE OF ORAL HYPOGLYCEMIC DRUGS: ICD-10-CM

## 2022-01-01 DIAGNOSIS — Z95.1 PRESENCE OF AORTOCORONARY BYPASS GRAFT: ICD-10-CM

## 2022-01-01 DIAGNOSIS — Z79.82 LONG TERM (CURRENT) USE OF ASPIRIN: ICD-10-CM

## 2022-01-01 DIAGNOSIS — I10 ESSENTIAL (PRIMARY) HYPERTENSION: ICD-10-CM

## 2022-01-01 DIAGNOSIS — I25.5 ISCHEMIC CARDIOMYOPATHY: ICD-10-CM

## 2022-01-01 DIAGNOSIS — I73.9 PERIPHERAL VASCULAR DISEASE, UNSPECIFIED: ICD-10-CM

## 2022-01-01 DIAGNOSIS — I50.23 ACUTE ON CHRONIC SYSTOLIC (CONGESTIVE) HEART FAILURE: ICD-10-CM

## 2022-01-01 DIAGNOSIS — E78.5 HYPERLIPIDEMIA, UNSPECIFIED: ICD-10-CM

## 2022-01-01 DIAGNOSIS — E11.9 TYPE 2 DIABETES MELLITUS WITHOUT COMPLICATIONS: ICD-10-CM

## 2022-01-01 DIAGNOSIS — I25.10 ATHEROSCLEROTIC HEART DISEASE OF NATIVE CORONARY ARTERY WITHOUT ANGINA PECTORIS: ICD-10-CM

## 2022-01-01 DIAGNOSIS — Z85.51 PERSONAL HISTORY OF MALIGNANT NEOPLASM OF BLADDER: ICD-10-CM

## 2022-01-01 DIAGNOSIS — E11.9 TYPE 2 DIABETES MELLITUS W/OUT COMPLICATIONS: ICD-10-CM

## 2022-01-01 DIAGNOSIS — C67.9 MALIGNANT NEOPLASM OF BLADDER, UNSPECIFIED: ICD-10-CM

## 2022-01-01 DIAGNOSIS — Z88.8 ALLERGY STATUS TO OTHER DRUGS, MEDICAMENTS AND BIOLOGICAL SUBSTANCES STATUS: ICD-10-CM

## 2022-01-01 DIAGNOSIS — I25.10 ATHEROSCLEROTIC HEART DISEASE OF NATIVE CORONARY ARTERY W/OUT ANGINA PECTORIS: ICD-10-CM

## 2022-01-01 DIAGNOSIS — Z86.79 PERSONAL HISTORY OF OTHER DISEASES OF THE CIRCULATORY SYSTEM: ICD-10-CM

## 2022-01-01 DIAGNOSIS — R00.1 BRADYCARDIA, UNSPECIFIED: ICD-10-CM

## 2022-01-01 DIAGNOSIS — I50.9 HEART FAILURE, UNSPECIFIED: ICD-10-CM

## 2022-01-01 DIAGNOSIS — I48.11 LONGSTANDING PERSISTENT ATRIAL FIBRILLATION: ICD-10-CM

## 2022-01-01 DIAGNOSIS — I48.20 CHRONIC ATRIAL FIBRILLATION, UNSP: ICD-10-CM

## 2022-01-01 DIAGNOSIS — E43 UNSPECIFIED SEVERE PROTEIN-CALORIE MALNUTRITION: ICD-10-CM

## 2022-01-01 DIAGNOSIS — I65.29 OCCLUSION AND STENOSIS OF UNSPECIFIED CAROTID ARTERY: ICD-10-CM

## 2022-01-01 DIAGNOSIS — Z95.1 PRESENCE OF AORTOCORONARY BYPASS GRAFT: Chronic | ICD-10-CM

## 2022-01-01 DIAGNOSIS — I48.20 CHRONIC ATRIAL FIBRILLATION, UNSPECIFIED: ICD-10-CM

## 2022-01-01 DIAGNOSIS — K64.8 OTHER HEMORRHOIDS: ICD-10-CM

## 2022-01-01 DIAGNOSIS — I44.1 ATRIOVENTRICULAR BLOCK, SECOND DEGREE: ICD-10-CM

## 2022-01-01 DIAGNOSIS — R63.4 ABNORMAL WEIGHT LOSS: ICD-10-CM

## 2022-01-01 DIAGNOSIS — Z87.891 PERSONAL HISTORY OF NICOTINE DEPENDENCE: ICD-10-CM

## 2022-01-01 LAB
A1C WITH ESTIMATED AVERAGE GLUCOSE RESULT: 6.3 % — HIGH (ref 4–5.6)
ALBUMIN SERPL ELPH-MCNC: 3.5 G/DL — SIGNIFICANT CHANGE UP (ref 3.3–5)
ALBUMIN SERPL ELPH-MCNC: 3.7 G/DL — SIGNIFICANT CHANGE UP (ref 3.3–5)
ALP SERPL-CCNC: 144 U/L — HIGH (ref 40–120)
ALP SERPL-CCNC: 192 U/L — HIGH (ref 40–120)
ALT FLD-CCNC: 44 U/L — SIGNIFICANT CHANGE UP (ref 10–45)
ALT FLD-CCNC: 93 U/L — HIGH (ref 10–45)
ANION GAP SERPL CALC-SCNC: 12 MMOL/L — SIGNIFICANT CHANGE UP (ref 5–17)
ANION GAP SERPL CALC-SCNC: 13 MMOL/L
ANION GAP SERPL CALC-SCNC: 13 MMOL/L — SIGNIFICANT CHANGE UP (ref 5–17)
ANION GAP SERPL CALC-SCNC: 14 MMOL/L — SIGNIFICANT CHANGE UP (ref 5–17)
ANION GAP SERPL CALC-SCNC: 16 MMOL/L
APTT BLD: 31.6 SEC — SIGNIFICANT CHANGE UP (ref 27.5–35.5)
AST SERPL-CCNC: 120 U/L — HIGH (ref 10–40)
AST SERPL-CCNC: 73 U/L — HIGH (ref 10–40)
BASOPHILS # BLD AUTO: 0.06 K/UL — SIGNIFICANT CHANGE UP (ref 0–0.2)
BASOPHILS # BLD AUTO: 0.07 K/UL
BASOPHILS NFR BLD AUTO: 0.7 %
BASOPHILS NFR BLD AUTO: 0.8 % — SIGNIFICANT CHANGE UP (ref 0–2)
BILIRUB SERPL-MCNC: 0.7 MG/DL — SIGNIFICANT CHANGE UP (ref 0.2–1.2)
BILIRUB SERPL-MCNC: 0.9 MG/DL — SIGNIFICANT CHANGE UP (ref 0.2–1.2)
BLD GP AB SCN SERPL QL: NEGATIVE — SIGNIFICANT CHANGE UP
BUN SERPL-MCNC: 25 MG/DL
BUN SERPL-MCNC: 32 MG/DL — HIGH (ref 7–23)
BUN SERPL-MCNC: 34 MG/DL
BUN SERPL-MCNC: 34 MG/DL — HIGH (ref 7–23)
BUN SERPL-MCNC: 36 MG/DL — HIGH (ref 7–23)
CALCIUM SERPL-MCNC: 10 MG/DL
CALCIUM SERPL-MCNC: 10.6 MG/DL
CALCIUM SERPL-MCNC: 9.3 MG/DL — SIGNIFICANT CHANGE UP (ref 8.4–10.5)
CALCIUM SERPL-MCNC: 9.4 MG/DL — SIGNIFICANT CHANGE UP (ref 8.4–10.5)
CALCIUM SERPL-MCNC: 9.6 MG/DL — SIGNIFICANT CHANGE UP (ref 8.4–10.5)
CHLORIDE SERPL-SCNC: 100 MMOL/L — SIGNIFICANT CHANGE UP (ref 96–108)
CHLORIDE SERPL-SCNC: 101 MMOL/L
CHLORIDE SERPL-SCNC: 101 MMOL/L — SIGNIFICANT CHANGE UP (ref 96–108)
CHLORIDE SERPL-SCNC: 94 MMOL/L
CHLORIDE SERPL-SCNC: 98 MMOL/L — SIGNIFICANT CHANGE UP (ref 96–108)
CHOLEST SERPL-MCNC: 143 MG/DL — SIGNIFICANT CHANGE UP
CO2 SERPL-SCNC: 24 MMOL/L — SIGNIFICANT CHANGE UP (ref 22–31)
CO2 SERPL-SCNC: 25 MMOL/L
CO2 SERPL-SCNC: 25 MMOL/L — SIGNIFICANT CHANGE UP (ref 22–31)
CO2 SERPL-SCNC: 25 MMOL/L — SIGNIFICANT CHANGE UP (ref 22–31)
CO2 SERPL-SCNC: 28 MMOL/L
CREAT SERPL-MCNC: 1.01 MG/DL
CREAT SERPL-MCNC: 1.25 MG/DL — SIGNIFICANT CHANGE UP (ref 0.5–1.3)
CREAT SERPL-MCNC: 1.27 MG/DL — SIGNIFICANT CHANGE UP (ref 0.5–1.3)
CREAT SERPL-MCNC: 1.29 MG/DL — SIGNIFICANT CHANGE UP (ref 0.5–1.3)
CREAT SERPL-MCNC: 1.31 MG/DL
EGFR: 54 ML/MIN/1.73M2
EGFR: 55 ML/MIN/1.73M2 — LOW
EGFR: 56 ML/MIN/1.73M2 — LOW
EGFR: 57 ML/MIN/1.73M2 — LOW
EGFR: 74 ML/MIN/1.73M2
EOSINOPHIL # BLD AUTO: 0.42 K/UL — SIGNIFICANT CHANGE UP (ref 0–0.5)
EOSINOPHIL # BLD AUTO: 0.59 K/UL
EOSINOPHIL NFR BLD AUTO: 5.5 % — SIGNIFICANT CHANGE UP (ref 0–6)
EOSINOPHIL NFR BLD AUTO: 6.2 %
ESTIMATED AVERAGE GLUCOSE: 134 MG/DL — HIGH (ref 68–114)
ESTIMATED AVERAGE GLUCOSE: 137 MG/DL
FERRITIN SERPL-MCNC: 60 NG/ML — SIGNIFICANT CHANGE UP (ref 30–400)
FOLATE SERPL-MCNC: 19.8 NG/ML — SIGNIFICANT CHANGE UP
GLUCOSE BLDC GLUCOMTR-MCNC: 103 MG/DL — HIGH (ref 70–99)
GLUCOSE BLDC GLUCOMTR-MCNC: 106 MG/DL — HIGH (ref 70–99)
GLUCOSE BLDC GLUCOMTR-MCNC: 106 MG/DL — HIGH (ref 70–99)
GLUCOSE BLDC GLUCOMTR-MCNC: 110 MG/DL — HIGH (ref 70–99)
GLUCOSE BLDC GLUCOMTR-MCNC: 112 MG/DL — HIGH (ref 70–99)
GLUCOSE BLDC GLUCOMTR-MCNC: 114 MG/DL — HIGH (ref 70–99)
GLUCOSE BLDC GLUCOMTR-MCNC: 138 MG/DL — HIGH (ref 70–99)
GLUCOSE BLDC GLUCOMTR-MCNC: 139 MG/DL — HIGH (ref 70–99)
GLUCOSE BLDC GLUCOMTR-MCNC: 143 MG/DL — HIGH (ref 70–99)
GLUCOSE BLDC GLUCOMTR-MCNC: 156 MG/DL — HIGH (ref 70–99)
GLUCOSE BLDC GLUCOMTR-MCNC: 157 MG/DL — HIGH (ref 70–99)
GLUCOSE BLDC GLUCOMTR-MCNC: 166 MG/DL — HIGH (ref 70–99)
GLUCOSE BLDC GLUCOMTR-MCNC: 179 MG/DL — HIGH (ref 70–99)
GLUCOSE BLDC GLUCOMTR-MCNC: 184 MG/DL — HIGH (ref 70–99)
GLUCOSE BLDC GLUCOMTR-MCNC: 94 MG/DL — SIGNIFICANT CHANGE UP (ref 70–99)
GLUCOSE SERPL-MCNC: 105 MG/DL — HIGH (ref 70–99)
GLUCOSE SERPL-MCNC: 109 MG/DL — HIGH (ref 70–99)
GLUCOSE SERPL-MCNC: 164 MG/DL — HIGH (ref 70–99)
GLUCOSE SERPL-MCNC: 177 MG/DL
GLUCOSE SERPL-MCNC: 187 MG/DL
HBA1C MFR BLD HPLC: 6.4 %
HCT VFR BLD CALC: 35.7 % — LOW (ref 39–50)
HCT VFR BLD CALC: 37.3 % — LOW (ref 39–50)
HCT VFR BLD CALC: 41.6 % — SIGNIFICANT CHANGE UP (ref 39–50)
HCT VFR BLD CALC: 45.1 %
HDLC SERPL-MCNC: 51 MG/DL — SIGNIFICANT CHANGE UP
HGB BLD-MCNC: 11.5 G/DL — LOW (ref 13–17)
HGB BLD-MCNC: 12 G/DL — LOW (ref 13–17)
HGB BLD-MCNC: 13.1 G/DL — SIGNIFICANT CHANGE UP (ref 13–17)
HGB BLD-MCNC: 13.5 G/DL
IMM GRANULOCYTES NFR BLD AUTO: 0.3 %
IMM GRANULOCYTES NFR BLD AUTO: 0.4 % — SIGNIFICANT CHANGE UP (ref 0–1.5)
INR BLD: 1.3 — HIGH (ref 0.88–1.16)
INR PPP: 1.46 RATIO
IRON SATN MFR SERPL: 17 % — SIGNIFICANT CHANGE UP (ref 16–55)
IRON SATN MFR SERPL: 49 UG/DL — SIGNIFICANT CHANGE UP (ref 45–165)
LIPID PNL WITH DIRECT LDL SERPL: 78 MG/DL — SIGNIFICANT CHANGE UP
LYMPHOCYTES # BLD AUTO: 0.94 K/UL — LOW (ref 1–3.3)
LYMPHOCYTES # BLD AUTO: 1.01 K/UL
LYMPHOCYTES # BLD AUTO: 12.3 % — LOW (ref 13–44)
LYMPHOCYTES NFR BLD AUTO: 10.7 %
MAGNESIUM SERPL-MCNC: 2.1 MG/DL — SIGNIFICANT CHANGE UP (ref 1.6–2.6)
MAN DIFF?: NORMAL
MCHC RBC-ENTMCNC: 28.1 PG
MCHC RBC-ENTMCNC: 28.1 PG — SIGNIFICANT CHANGE UP (ref 27–34)
MCHC RBC-ENTMCNC: 28.5 PG — SIGNIFICANT CHANGE UP (ref 27–34)
MCHC RBC-ENTMCNC: 29.1 PG — SIGNIFICANT CHANGE UP (ref 27–34)
MCHC RBC-ENTMCNC: 29.9 GM/DL
MCHC RBC-ENTMCNC: 31.5 GM/DL — LOW (ref 32–36)
MCHC RBC-ENTMCNC: 32.2 GM/DL — SIGNIFICANT CHANGE UP (ref 32–36)
MCHC RBC-ENTMCNC: 32.2 GM/DL — SIGNIFICANT CHANGE UP (ref 32–36)
MCV RBC AUTO: 87.3 FL — SIGNIFICANT CHANGE UP (ref 80–100)
MCV RBC AUTO: 90.5 FL — SIGNIFICANT CHANGE UP (ref 80–100)
MCV RBC AUTO: 90.6 FL — SIGNIFICANT CHANGE UP (ref 80–100)
MCV RBC AUTO: 94 FL
MONOCYTES # BLD AUTO: 0.47 K/UL — SIGNIFICANT CHANGE UP (ref 0–0.9)
MONOCYTES # BLD AUTO: 0.57 K/UL
MONOCYTES NFR BLD AUTO: 6 %
MONOCYTES NFR BLD AUTO: 6.1 % — SIGNIFICANT CHANGE UP (ref 2–14)
NEUTROPHILS # BLD AUTO: 5.73 K/UL — SIGNIFICANT CHANGE UP (ref 1.8–7.4)
NEUTROPHILS # BLD AUTO: 7.21 K/UL
NEUTROPHILS NFR BLD AUTO: 74.9 % — SIGNIFICANT CHANGE UP (ref 43–77)
NEUTROPHILS NFR BLD AUTO: 76.1 %
NON HDL CHOLESTEROL: 92 MG/DL — SIGNIFICANT CHANGE UP
NRBC # BLD: 0 /100 WBCS — SIGNIFICANT CHANGE UP (ref 0–0)
NT-PROBNP SERPL-SCNC: HIGH PG/ML (ref 0–300)
PLATELET # BLD AUTO: 224 K/UL — SIGNIFICANT CHANGE UP (ref 150–400)
PLATELET # BLD AUTO: 227 K/UL — SIGNIFICANT CHANGE UP (ref 150–400)
PLATELET # BLD AUTO: 264 K/UL — SIGNIFICANT CHANGE UP (ref 150–400)
PLATELET # BLD AUTO: 307 K/UL
POTASSIUM SERPL-MCNC: 4.1 MMOL/L — SIGNIFICANT CHANGE UP (ref 3.5–5.3)
POTASSIUM SERPL-SCNC: 4.1 MMOL/L — SIGNIFICANT CHANGE UP (ref 3.5–5.3)
POTASSIUM SERPL-SCNC: 4.2 MMOL/L
POTASSIUM SERPL-SCNC: 5.8 MMOL/L
PROT SERPL-MCNC: 7.5 G/DL — SIGNIFICANT CHANGE UP (ref 6–8.3)
PROT SERPL-MCNC: 7.7 G/DL — SIGNIFICANT CHANGE UP (ref 6–8.3)
PROTHROM AB SERPL-ACNC: 15.5 SEC — HIGH (ref 10.5–13.4)
PT BLD: 17 SEC
RBC # BLD: 4.09 M/UL — LOW (ref 4.2–5.8)
RBC # BLD: 4.12 M/UL — LOW (ref 4.2–5.8)
RBC # BLD: 4.59 M/UL — SIGNIFICANT CHANGE UP (ref 4.2–5.8)
RBC # BLD: 4.8 M/UL
RBC # FLD: 14.2 % — SIGNIFICANT CHANGE UP (ref 10.3–14.5)
RBC # FLD: 14.3 % — SIGNIFICANT CHANGE UP (ref 10.3–14.5)
RBC # FLD: 14.4 %
RBC # FLD: 14.5 % — SIGNIFICANT CHANGE UP (ref 10.3–14.5)
RH IG SCN BLD-IMP: NEGATIVE — SIGNIFICANT CHANGE UP
SARS-COV-2 RNA SPEC QL NAA+PROBE: SIGNIFICANT CHANGE UP
SODIUM SERPL-SCNC: 136 MMOL/L — SIGNIFICANT CHANGE UP (ref 135–145)
SODIUM SERPL-SCNC: 138 MMOL/L — SIGNIFICANT CHANGE UP (ref 135–145)
SODIUM SERPL-SCNC: 138 MMOL/L — SIGNIFICANT CHANGE UP (ref 135–145)
SODIUM SERPL-SCNC: 139 MMOL/L
SODIUM SERPL-SCNC: 139 MMOL/L
TIBC SERPL-MCNC: 289 UG/DL — SIGNIFICANT CHANGE UP (ref 220–430)
TRIGL SERPL-MCNC: 72 MG/DL — SIGNIFICANT CHANGE UP
TSH SERPL-MCNC: 3.28 UIU/ML — SIGNIFICANT CHANGE UP (ref 0.27–4.2)
UIBC SERPL-MCNC: 240 UG/DL — SIGNIFICANT CHANGE UP (ref 110–370)
VIT B12 SERPL-MCNC: >2000 PG/ML — HIGH (ref 232–1245)
WBC # BLD: 10.41 K/UL — SIGNIFICANT CHANGE UP (ref 3.8–10.5)
WBC # BLD: 7.65 K/UL — SIGNIFICANT CHANGE UP (ref 3.8–10.5)
WBC # BLD: 9.15 K/UL — SIGNIFICANT CHANGE UP (ref 3.8–10.5)
WBC # FLD AUTO: 10.41 K/UL — SIGNIFICANT CHANGE UP (ref 3.8–10.5)
WBC # FLD AUTO: 7.65 K/UL — SIGNIFICANT CHANGE UP (ref 3.8–10.5)
WBC # FLD AUTO: 9.15 K/UL — SIGNIFICANT CHANGE UP (ref 3.8–10.5)
WBC # FLD AUTO: 9.48 K/UL

## 2022-01-01 PROCEDURE — 99215 OFFICE O/P EST HI 40 MIN: CPT

## 2022-01-01 PROCEDURE — 99348 HOME/RES VST EST LOW MDM 30: CPT | Mod: 95

## 2022-01-01 PROCEDURE — 33249 INSJ/RPLCMT DEFIB W/LEAD(S): CPT

## 2022-01-01 PROCEDURE — 99205 OFFICE O/P NEW HI 60 MIN: CPT

## 2022-01-01 PROCEDURE — 99204 OFFICE O/P NEW MOD 45 MIN: CPT

## 2022-01-01 PROCEDURE — 93000 ELECTROCARDIOGRAM COMPLETE: CPT

## 2022-01-01 PROCEDURE — 36415 COLL VENOUS BLD VENIPUNCTURE: CPT

## 2022-01-01 PROCEDURE — 71046 X-RAY EXAM CHEST 2 VIEWS: CPT | Mod: 26

## 2022-01-01 PROCEDURE — 33225 L VENTRIC PACING LEAD ADD-ON: CPT

## 2022-01-01 PROCEDURE — 99349 HOME/RES VST EST MOD MDM 40: CPT

## 2022-01-01 PROCEDURE — 93306 TTE W/DOPPLER COMPLETE: CPT | Mod: 26

## 2022-01-01 RX ORDER — GLUCAGON INJECTION, SOLUTION 0.5 MG/.1ML
1 INJECTION, SOLUTION SUBCUTANEOUS ONCE
Refills: 0 | Status: DISCONTINUED | OUTPATIENT
Start: 2022-01-01 | End: 2022-01-01

## 2022-01-01 RX ORDER — ASPIRIN/CALCIUM CARB/MAGNESIUM 324 MG
81 TABLET ORAL DAILY
Refills: 0 | Status: DISCONTINUED | OUTPATIENT
Start: 2022-01-01 | End: 2022-01-01

## 2022-01-01 RX ORDER — FUROSEMIDE 40 MG
40 TABLET ORAL DAILY
Refills: 0 | Status: DISCONTINUED | OUTPATIENT
Start: 2022-01-01 | End: 2022-01-01

## 2022-01-01 RX ORDER — MULTIVITAMIN
TABLET ORAL
Refills: 0 | Status: ACTIVE | COMMUNITY

## 2022-01-01 RX ORDER — FERROUS GLUCONATE 100 %
1 POWDER (GRAM) MISCELLANEOUS
Qty: 0 | Refills: 0 | DISCHARGE

## 2022-01-01 RX ORDER — FUROSEMIDE 40 MG
1 TABLET ORAL
Qty: 0 | Refills: 0 | DISCHARGE

## 2022-01-01 RX ORDER — INSULIN LISPRO 100/ML
VIAL (ML) SUBCUTANEOUS
Refills: 0 | Status: DISCONTINUED | OUTPATIENT
Start: 2022-01-01 | End: 2022-01-01

## 2022-01-01 RX ORDER — APIXABAN 2.5 MG/1
1 TABLET, FILM COATED ORAL
Qty: 0 | Refills: 0 | DISCHARGE

## 2022-01-01 RX ORDER — SODIUM CHLORIDE 9 MG/ML
1000 INJECTION, SOLUTION INTRAVENOUS
Refills: 0 | Status: DISCONTINUED | OUTPATIENT
Start: 2022-01-01 | End: 2022-01-01

## 2022-01-01 RX ORDER — SACUBITRIL AND VALSARTAN 49; 51 MG/1; MG/1
TABLET, FILM COATED ORAL
Refills: 0 | Status: DISCONTINUED | COMMUNITY
End: 2022-01-01

## 2022-01-01 RX ORDER — AMLODIPINE BESYLATE 2.5 MG/1
1 TABLET ORAL
Qty: 0 | Refills: 0 | DISCHARGE

## 2022-01-01 RX ORDER — ASCORBIC ACID 60 MG
500 TABLET,CHEWABLE ORAL DAILY
Refills: 0 | Status: DISCONTINUED | OUTPATIENT
Start: 2022-01-01 | End: 2022-01-01

## 2022-01-01 RX ORDER — VANCOMYCIN HCL 1 G
1000 VIAL (EA) INTRAVENOUS ONCE
Refills: 0 | Status: COMPLETED | OUTPATIENT
Start: 2022-01-01 | End: 2022-01-01

## 2022-01-01 RX ORDER — METOPROLOL SUCCINATE 25 MG/1
25 TABLET, EXTENDED RELEASE ORAL
Refills: 0 | Status: ACTIVE | COMMUNITY

## 2022-01-01 RX ORDER — FUROSEMIDE 40 MG
40 TABLET ORAL ONCE
Refills: 0 | Status: COMPLETED | OUTPATIENT
Start: 2022-01-01 | End: 2022-01-01

## 2022-01-01 RX ORDER — FUROSEMIDE 80 MG/1
TABLET ORAL
Refills: 0 | Status: ACTIVE | COMMUNITY

## 2022-01-01 RX ORDER — MULTIVIT-MIN/FERROUS GLUCONATE 9 MG/15 ML
1 LIQUID (ML) ORAL DAILY
Refills: 0 | Status: DISCONTINUED | OUTPATIENT
Start: 2022-01-01 | End: 2022-01-01

## 2022-01-01 RX ORDER — DEXTROSE 50 % IN WATER 50 %
15 SYRINGE (ML) INTRAVENOUS ONCE
Refills: 0 | Status: DISCONTINUED | OUTPATIENT
Start: 2022-01-01 | End: 2022-01-01

## 2022-01-01 RX ORDER — SACUBITRIL AND VALSARTAN 24; 26 MG/1; MG/1
24-26 TABLET, FILM COATED ORAL
Refills: 0 | Status: DISCONTINUED | COMMUNITY
End: 2022-01-01

## 2022-01-01 RX ORDER — EZETIMIBE 10 MG/1
1 TABLET ORAL
Qty: 0 | Refills: 0 | DISCHARGE

## 2022-01-01 RX ORDER — PREGABALIN 225 MG/1
1000 CAPSULE ORAL DAILY
Refills: 0 | Status: DISCONTINUED | OUTPATIENT
Start: 2022-01-01 | End: 2022-01-01

## 2022-01-01 RX ORDER — DEXTROSE 50 % IN WATER 50 %
12.5 SYRINGE (ML) INTRAVENOUS ONCE
Refills: 0 | Status: DISCONTINUED | OUTPATIENT
Start: 2022-01-01 | End: 2022-01-01

## 2022-01-01 RX ORDER — ASCORBIC ACID 500 MG
TABLET ORAL
Refills: 0 | Status: ACTIVE | COMMUNITY

## 2022-01-01 RX ORDER — DEXTROSE 50 % IN WATER 50 %
25 SYRINGE (ML) INTRAVENOUS ONCE
Refills: 0 | Status: DISCONTINUED | OUTPATIENT
Start: 2022-01-01 | End: 2022-01-01

## 2022-01-01 RX ORDER — AMLODIPINE BESYLATE 10 MG/1
10 TABLET ORAL
Qty: 90 | Refills: 0 | Status: DISCONTINUED | COMMUNITY
Start: 2018-02-16 | End: 2022-01-01

## 2022-01-01 RX ORDER — ASPIRIN/CALCIUM CARB/MAGNESIUM 324 MG
1 TABLET ORAL
Qty: 0 | Refills: 0 | DISCHARGE

## 2022-01-01 RX ORDER — IRON/IRON ASP GLY/FA/MV-MIN 38 125-25-1MG
TABLET ORAL
Refills: 0 | Status: ACTIVE | COMMUNITY

## 2022-01-01 RX ORDER — PREGABALIN 225 MG/1
1 CAPSULE ORAL
Qty: 0 | Refills: 0 | DISCHARGE

## 2022-01-01 RX ORDER — METOPROLOL TARTRATE 50 MG
25 TABLET ORAL DAILY
Refills: 0 | Status: DISCONTINUED | OUTPATIENT
Start: 2022-01-01 | End: 2022-01-01

## 2022-01-01 RX ORDER — METOPROLOL TARTRATE 50 MG
1 TABLET ORAL
Qty: 30 | Refills: 2
Start: 2022-01-01 | End: 2022-07-06

## 2022-01-01 RX ORDER — OMEGA-3/DHA/EPA/FISH OIL 300-1000MG
CAPSULE ORAL
Refills: 0 | Status: ACTIVE | COMMUNITY

## 2022-01-01 RX ORDER — METFORMIN HYDROCHLORIDE 850 MG/1
1 TABLET ORAL
Qty: 0 | Refills: 0 | DISCHARGE

## 2022-01-01 RX ORDER — ASCORBIC ACID 60 MG
1 TABLET,CHEWABLE ORAL
Qty: 0 | Refills: 0 | DISCHARGE

## 2022-01-01 RX ORDER — MULTIVIT-MIN/FERROUS GLUCONATE 9 MG/15 ML
1 LIQUID (ML) ORAL
Qty: 0 | Refills: 0 | DISCHARGE

## 2022-01-01 RX ORDER — APIXABAN 2.5 MG/1
5 TABLET, FILM COATED ORAL
Refills: 0 | Status: DISCONTINUED | OUTPATIENT
Start: 2022-01-01 | End: 2022-01-01

## 2022-01-01 RX ORDER — MAGNESIUM OXIDE 400 MG ORAL TABLET 241.3 MG
1 TABLET ORAL
Qty: 0 | Refills: 0 | DISCHARGE

## 2022-01-01 RX ORDER — FUROSEMIDE 80 MG/1
TABLET ORAL
Refills: 0 | Status: COMPLETED | COMMUNITY
End: 2022-01-01

## 2022-01-01 RX ADMIN — Medication 81 MILLIGRAM(S): at 11:28

## 2022-01-01 RX ADMIN — Medication 1 TABLET(S): at 19:07

## 2022-01-01 RX ADMIN — Medication 2: at 21:44

## 2022-01-01 RX ADMIN — Medication 40 MILLIGRAM(S): at 06:24

## 2022-01-01 RX ADMIN — Medication 81 MILLIGRAM(S): at 11:48

## 2022-01-01 RX ADMIN — Medication 500 MILLIGRAM(S): at 11:28

## 2022-01-01 RX ADMIN — Medication 81 MILLIGRAM(S): at 12:14

## 2022-01-01 RX ADMIN — Medication 1 TABLET(S): at 11:28

## 2022-01-01 RX ADMIN — Medication 500 MILLIGRAM(S): at 12:12

## 2022-01-01 RX ADMIN — Medication 1 TABLET(S): at 11:26

## 2022-01-01 RX ADMIN — Medication 40 MILLIGRAM(S): at 05:53

## 2022-01-01 RX ADMIN — Medication 500 MILLIGRAM(S): at 19:06

## 2022-01-01 RX ADMIN — APIXABAN 5 MILLIGRAM(S): 2.5 TABLET, FILM COATED ORAL at 21:04

## 2022-01-01 RX ADMIN — Medication 25 MILLIGRAM(S): at 11:28

## 2022-01-01 RX ADMIN — PREGABALIN 1000 MICROGRAM(S): 225 CAPSULE ORAL at 19:07

## 2022-01-01 RX ADMIN — Medication 40 MILLIGRAM(S): at 21:05

## 2022-01-01 RX ADMIN — PREGABALIN 1000 MICROGRAM(S): 225 CAPSULE ORAL at 12:12

## 2022-01-01 RX ADMIN — PREGABALIN 1000 MICROGRAM(S): 225 CAPSULE ORAL at 11:28

## 2022-01-01 RX ADMIN — Medication 2: at 21:42

## 2022-01-01 RX ADMIN — Medication 2: at 21:50

## 2022-01-01 RX ADMIN — APIXABAN 5 MILLIGRAM(S): 2.5 TABLET, FILM COATED ORAL at 09:10

## 2022-01-01 RX ADMIN — Medication 81 MILLIGRAM(S): at 19:07

## 2022-01-01 RX ADMIN — Medication 1 TABLET(S): at 12:12

## 2022-01-01 RX ADMIN — PREGABALIN 1000 MICROGRAM(S): 225 CAPSULE ORAL at 11:26

## 2022-01-01 RX ADMIN — APIXABAN 5 MILLIGRAM(S): 2.5 TABLET, FILM COATED ORAL at 21:13

## 2022-01-01 RX ADMIN — Medication 40 MILLIGRAM(S): at 12:12

## 2022-01-01 RX ADMIN — APIXABAN 5 MILLIGRAM(S): 2.5 TABLET, FILM COATED ORAL at 11:26

## 2022-01-01 RX ADMIN — Medication 2: at 22:02

## 2022-01-01 RX ADMIN — Medication 2: at 11:52

## 2022-01-01 RX ADMIN — Medication 250 MILLIGRAM(S): at 03:01

## 2022-01-01 RX ADMIN — Medication 25 MILLIGRAM(S): at 06:24

## 2022-01-01 RX ADMIN — Medication 500 MILLIGRAM(S): at 11:26

## 2022-01-01 RX ADMIN — Medication 40 MILLIGRAM(S): at 05:31

## 2022-01-27 PROBLEM — K57.30 DIVERTICULOSIS OF COLON: Status: ACTIVE | Noted: 2022-01-01

## 2022-01-27 PROBLEM — Z12.11 SCREENING FOR COLON CANCER: Status: ACTIVE | Noted: 2022-01-01

## 2022-01-27 PROBLEM — R63.4 ABNORMAL WEIGHT LOSS: Status: ACTIVE | Noted: 2022-01-01

## 2022-01-27 PROBLEM — K63.5 COLON POLYP: Status: ACTIVE | Noted: 2022-01-01

## 2022-01-27 PROBLEM — K64.8 HEMORRHOIDS, INTERNAL: Status: ACTIVE | Noted: 2022-01-01

## 2022-01-27 PROBLEM — D64.9 ANEMIA, UNSPECIFIED TYPE: Status: ACTIVE | Noted: 2022-01-01

## 2022-01-27 NOTE — ASSESSMENT
[FreeTextEntry1] : \par \par \par 1. Anemia\par    assoc w wt loss--20lb\par    Ct CH/ABD/Pelv-> bladder wall thickening\par    No GI pathology evident \par    H/O Colon Polyps\par \par    R/O GI source of anemia\par    R/O PUD\par           Neoplasia\par           AVMs\par P:  check cbc w iron studies\par      EGD\par     Colonoscopy\par     possibel capsule endoscopy\par \par \par \par \par \par 2. History of Colon Polyps\par   see Colorectal  Neoplasia  Screening:  to be evaluated\par \par \par \par \par \par \par 3. Colorectal  / Gastric Neoplasia  Screening:  to be evaluated\par   Utilizing teaching posters and anatomical models the following were discussed and emphasized with the patient in detail: \par * Discussed the pre-malignant potential of polyps\par * Discussed the importance of f/u surveillance / screening colonoscopy \par * moderate-High  Fiber Diet was reviewed and emphasized\par * Anti-oxidants and ASA/NSAID Therapy emphasized\par * Given age > 40-51 yo & +PH Colon Polyps \par * Recommend Colonoscopy  to  R/O  Colonic Neoplasia-- in 2022\par \par \par \par \par \par 4. Diverticulosis:  well - controlled.  No pain,  infection,  bleeding\par Recommendations\par * Discussed  and  reviewed the potential complications of perforation,  pain,  infection,  bleeding \par * Moderate-High  Fiber Diet was reviewed & emphasized\par * Daily fluid intake was reviewed : 6  --  8 cups of decaffeinated fluid daily\par \par \par \par \par \par \par \par 5. Hemorrhoids:  well - controlled.  No pain,  swelling,  itch,  bleeding\par * Discussed the  potential complications of thrombosis,  pain,  infection,  swelling, itching,  bleeding --none recently\par Recommendations: \par * Moderate- High  Fiber Diet was reviewed and emphasized\par * 6  --  8 cups of decaffeinated fluid daily was emphasized \par * Sitz Bathes as needed ,  No:  Anusol HC  Suppos / Cream  [place] BID -- was needed\par * No:  Tucks BID,  Balneol Lotion,   Calmoseptine Oint -- was needed ;    can use  Prep H prn\par * No:  need for  Colorectal surgical evaluation for possible ablation \par \par \par \par \par \par \par Informed Consent:\par * The risks & Benefits of EGD  /  Colonoscopy were discussed w patient.\par * This included but was not limited to perforation, bleeding, sedation /med rxns possibly requiring surgery, blood transfusions, antibiotics & CPR/Intubation.\par * Pt. understands & agrees to the procedures.\par The following instructions in regards to the prep and medically essential ( cardiac, pulmonary, sz, psych, endocrine)  pre-op medication administration\par was reviewed and emphasized with the patient . \par * Pt. advised to D/C  ASA/NSAIDs  7  Days  &  eliquis  3  Days  PTP.\par * [ +++ ]  Dulcolax / Miralax / Mag. Citrate ,  [     ] Prepopik/ Clenpiq ,  [     ] Osmo Prep,  [    ] GoLytely,  prep. reviewed w Pt.\par * Hold  [   metformin         ] AM of procedure.\par * Hold  [           ] PM  before procedure.\par * Take  [           ] PM  before procedure.\par * Take  [  amlodipine          ] AM of procedure.\par \par

## 2022-01-27 NOTE — REVIEW OF SYSTEMS
[Red Eyes] : eyes not red [Skin Lesions] : no skin lesions [Confused] : no confusion [Proptosis] : no proptosis [Easy Bruising] : no tendency for easy bruising

## 2022-01-27 NOTE — HISTORY OF PRESENT ILLNESS
[de-identified] : \par This HPI reflects a summary and review of records : including previous and most recent  Labs, body imaging, consults and progress notes, operative and pathology reports, EKG reports, ED records, found in [facility] [place], [employer],  [employer] and any additional records brought in by  the patient at the time of the visit.\par \par \par PCP: Dylan\par 82 yo M w h/o CAD--s/p CAG, Afib, mild Pul HTN, HTN, DM, HLD, Carotid dis-s/p CEA, Asbestosis,\par RTA, Bladder Ca-multifocal--s/p TURB & BCG, Cervical Disc Dis, \par Colon Polyps, Diverticulosis, Hemorrhoids\par \par 9/9/21 CT Abd/Pelv: wall thickening gastric fundus/body\par 11/3/21 Labs: Hgb=10.9, MCV=93, RDW=13, BUN=12, crerat=0.9\par \par 1/27/21 :    Today:  Feeling well, no c/o , CP, SOB, Cough, Wheeze, Palpitations, edema\par   Most recent labs and imaging reviewed w patient:  11/2021 & 9/2021\par \par Saw Dr Richardson 1/11/22 for F/U: told of Iron def anemia on Eliquis/ ASA\par Has hematuria being evaluated by Dr. Ashford\par Also noted wt loss 179 to 159, had Covid and lost sense of taste and decr appetite\par Had CT CH which showed mild scaring at the lung bases but No masses\par \par 1/27/22   Today: denies any melena, brbpr, change in bowel habits, N/V, \par                ? early satiety,  no dysphagia\par \par * Abd pain-->no\par * Nausea--> no\par * Vomit--> no\par * Early satiety--> some \par * Belching--> no\par * Hiccups--> no\par * Regurgitation--> no\par * Acid Taste / Water Brash--> no\par * Ht burn--> no\par * Dysphagia--> no\par * Throat Clearing--> no\par * Hoarseness--> no\par * Post-Nasal Drip--> no\par * Congestion--> no\par * Globus--> no\par * Cough--> no\par * Wheeze / PC-> -no\par * BMs: # 4 qd\par * Constipation--> no\par * Diarrhea--> no\par * Bloating--> no\par * Strain on Defecation--> no\par * Incompl Evac--> no\par * Flatulence--> no\par * Gurgling--> no\par * Melena--> no\par * BPBPR-> -no\par * Anorexia--> yes\par \par \par

## 2022-03-08 NOTE — ASSESSMENT
[FreeTextEntry1] : pt alert and oriented x 3 at time of audio call.  Pt doesn’t have the technology required for visualization.  Pt resps were easy and no cough, wheeze or dypnea noted via tele.  Pt was speaking in full sentences.  Medications reviewed, pt has f/u appointments scheduled for Thursday with cardiology and Friday with PCP. No questions or concerns reported.

## 2022-03-08 NOTE — COUNSELING
[de-identified] : Partnered TCM STARS teaching: Enforced with patient need for daily weights. Advised to call for an increase of greater than 2 lbs. in a day or 5 pounds in a week. Adhere to low salt diet and educated patient on foods that should be avoided such as processed or fast food. Limit fluids to 1 liter a day which is 4-5 glasses. Continue medications as ordered.  Follow up with Cardiologist. Contact information given, patient advised to call with any questions/concerns\par

## 2022-03-08 NOTE — PLAN
[FreeTextEntry1] : CHF\par c/w medication regimen\par c/w daily weights\par \par HTN\par hh diet\par c/w medication regimen

## 2022-03-10 NOTE — DISCUSSION/SUMMARY
[FreeTextEntry1] : \par \par Congestive heart failure\par The working diagnosis is congestive heart failure secondary to heart failure with reduced ejection fraction (3/22 echocardiogram 23%) due  to an ischemic-diabetic cardiomyopathy. The rather sudden onset of heart failure raise concern for coronary artery bypass graft closure as a contributing factor.\par Right and left heart catheterization would be helpful for further evaluation\par \par .With regard to medical therapy bradycardia and a prior history of Mobitz  I heart block in the past (when  in sinus rhythm ) preclude the administration of beta blockers.  Prior hyperkalemia thought to be related to renal tubular acidosis exacerbated by the administration of lisinopril led to discontinuation of ACE-I/ARB therapy. In this regard treatment with Entresto may be problematic.. Simultaneous administration of Lasix may allow for treatment with this agent.\par \par \par I have recommended the following\par a. Discontinue amlodipine\par b. Routine laboratory studies including electrolytes and renal function are ordered\par c. Further medical regimen adjustment dictated by the above\par d. Right and left heart catheterization as discussed above\par e. Possible AICD/permanent pacemaker implantation in the future depending on the patient's clinical course\par \par \par \par \par Atrial fibrillation\par The working diagnosis is atrial fibrillation secondary to atherosclerotic diabetic hypertensive heart disease.Paroxysmal in the past, atrial fibrillation appears to be chronic at this time.  In 5/18 sinus bradycardia with Mobitz type I second-degree AV block was noted. Marked bradycardia  (40/minute) led to discontinuation of all AV brigid blocking agents/beta blockers/metoprolol in 12/19.. A 7/20 Zio patch/mobile telemetry study demonstrated atrial fibrillation with rate variation /minute and an average of 49/minute. There were  no symptoms. The control to slow ventricular response in atrial fibrillation in the absence of any AV brigid blocking agent is consistent with a diagnosis of AV brigid sclerosis. Permanent pacemaker implantation is not indicated in the absence of symptomatic bradycardia or high degree AV block. An elevated "NANCY [id number]" score is indicative of an increased risk of systemic/cerebral emboli. Factor Xa . inhibitor therapy is indicated. \par \par \par I have recommended the following:\par 1.No further cardiac testing for this problem at this time\par 2 continue the present medical regimen \par \par \par \par \par \par Atherosclerotic heart disease\par Daniel has known atherosclerotic heart disease. In 9/07 he underwent coronary artery bypass graft surgery with a robotic-assisted LIMA-LAD. Recurrent symptoms and stenosis of the anastomosis of the LIMA-LAD led to a second operation in 11/07. The operation consisted of a  free LAURE-diagonal/LAD and  saphenous vein grafts to OM1 and OM 3. The right coronary artery was nonobstructive.  A 5/18 stress sestamibi study demonstrated only a small area of mild partial anterior apical ischemia.. The resting  3/22  electrocardiogram shows no evidence of myocardial -ischemia or infarction. Left ventricle systolic function  had been  normal as reflected by a left ventricle ejection fraction of 66% on the 7/07 left ventriculogram   67% on the 5/18  gaited sesta mibi study and 61 % on the  7/20 echocardiogram.\par \par Left ventricular systolic function is now markedly depressed reflected by a left ventricular ejection fraction 23% on the 3/22 echocardiogram . In light of the decline in left ventricular  systolic performance coronary arteriography would be helpful to rule out the possibility of graft closure and assess treatment alternatives with previous physician procedures.\par \par I have recommended the following\par a. Right and left heart catheterization as discussed above.\par \par \par \par \par \par Valvular heart disease\par The 3/22  echocardiogram demonstrated moderate  mitral /tricuspid  regurgitation and aortic valve sclerosis. Mild pulmonary hypertension was noted with a pulmonary artery systolic pressure of 41 mmHg.The left atrial volume index was markedly increased at 55 ml/m2 The left ventricle ejection fraction was 23%. The present cardiac physical examination is not suggestive of severe mitral regurgitation. In\par \par I have recommended the following\par a.  no further cardiac testing for this problem at this time\par \par \par \par \par \par Hypertension\par Hypertension has been severe and difficult to control. Problems with hyperkalemia/renal insufficiency while on lisinopril have  previously prevented the administration of  ACE- I./ARB therapy. Concerns for prior Mobitz type I second-degree AV block /bradycardia preclude  beta blockers. .\par Relative systemic hypotension today (90/40 mmHg) is related to the administration of multiple antihypertensive agents.  The administration of entresto warrants careful monitoring of electrolytes and renal function.\par \par \par I have recommended the following\par 1. Low-salt low-fat low-cholesterol diabetic diet. Regular aerobic exercise\par 2. Discontinue amlodipine\par 3. Routine laboratory studies including electrolytes and renal function are ordered\par 4. Home blood pressure monitoring\par 5 Further medical regimen adjustment dictated by the above and the patient's clinical course\par \par \par \par \par Hyperlipidemia\par Hyperlipidemia represents a risk factor for progressive atherosclerotic disease. Target LDL level with known atherosclerotic heart disease is about 70. Intolerance of HMG coA reductase inhibitor therapy has prevented its administration. In 3/22  the serum cholesterol level was 143  triglycerides 69  HDL 48  and LDL 81 . Recent data has indicated the benefit of PCSK9 inhibition therapy in patients in whom  statin therapy was either ineffective or  not tolerated. \par Nonpharmacological therapy, specifically diet and exercise are emphasized  as  major aspects of treatment.\par \par \par I have recommended the following:\par 1. Low-salt low-fat low-cholesterol diet. Regular aerobic exercise\par 2 continue the present medical regimen\par 3  consideration for PCSK9 inhibition therapy dependent on followup lipid levels as discussed above\par \par \par \par \par Carotid stenosis\par In 6/13  Daniel  underwent a right carotid endarterectomy for  a  high-grade stenosis. He has been followed by Dr. Ruano /vascular surgery with regular ultrasound studies. A left carotid bruit audible today was not appreciated on the previous physical examination. The last ultrasound study available at this time is an  8/16 study demonstrating a patent internal right carotid artery. The left carotid artery had plaque without an obstructing lesion.\par \par I have  recommended the following\par a. Vascular surgical followup Dr. Ruano with anticipation of an updated carotid artery ultrasound/duplex study\par \par \par \par The diagnosis, prognosis, risks, options and alternatives were explained at length to the patient and family.t. All questions were answered. Issues discussed included  heart failure hyperkalemia  bradycardia atherosclerotic heart disease, hyperlipidemia, hypertension ,carotid stenosis cardioembolic stroke hyperlipidemia  anticoagulation  noninvasive cardiac l testing  diet/exercise. and right/left heart catheterization \par \par \par \par Counseling and/or coordination of care\par Time was a significant factor for this patient encounter. Total time spent with the patient was 40   minutes. 50% of the time was devoted to counseling and/or coordination of care

## 2022-03-10 NOTE — REVIEW OF SYSTEMS
[Feeling Fatigued] : feeling fatigued [Dyspnea on exertion] : dyspnea during exertion [Joint Pain] : joint pain [Negative] : Heme/Lymph [FreeTextEntry2] : anorexia. loss of taste [FreeTextEntry3] : "floater"  (one episode). awaiting eye exam [FreeTextEntry5] : see history of present illness

## 2022-03-22 PROBLEM — Z86.79 HISTORY OF CORONARY ARTERY DISEASE: Status: RESOLVED | Noted: 2018-05-07 | Resolved: 2022-01-01

## 2022-03-22 PROBLEM — I73.9 PERIPHERAL VASCULAR DISEASE: Status: ACTIVE | Noted: 2022-01-01

## 2022-03-22 PROBLEM — Z86.79 HISTORY OF CONGESTIVE HEART FAILURE: Status: RESOLVED | Noted: 2022-01-01 | Resolved: 2022-01-01

## 2022-03-22 PROBLEM — I65.29 CAROTID STENOSIS: Status: ACTIVE | Noted: 2021-02-10

## 2022-03-22 NOTE — DISCUSSION/SUMMARY
[FreeTextEntry1] : Congestive heart failure\par The working diagnosis is congestive heart failure secondary to heart failure with reduced ejection fraction (3/22 echocardiogram 23  - 35 %) due  to an ischemic-diabetic cardiomyopathy. The differential diagnosis would include  Covid 19 related myocarditis.  The present cardiac physical examination is consistent with a euvolemic state [employer] Association last II-III.\par \par \par . Comment\par Medical therapy for  heart failure/left ventricular systolic dysfunction is limited at this time\par .Bradycardia and a prior history of Mobitz  I heart block in the past (when  in sinus rhythm ) preclude the administration of beta blockers.  Prior hyperkalemia thought to be related to renal tubular acidosis exacerbated by the administration of lisinopril led to discontinuation of ACE-I/ARB therapy.. Hyperkalemia ( 5.8 mmol/l) led to discontinuation of treatment with Entresto.\par Implantation of a device AICD/pacemaker would allow for the administration of beta blockers\par The right heart catheterization showing  a pulmonary  capillary wedge pressure of 2 mmHg  is indicative of intravascular volume depletion\par \par \par I have recommended the following\par a. Discontinue entresto\par b  Decrease Lasix dose from 40 mg b.i.d. to 40 mg/day\par c   Electrophysiology consultation  regarding AICD/permanent pacemaker implantation  Dr. Hager  [phone]\par \par \par \par \par Atrial fibrillation\par The working diagnosis is  chronic atrial fibrillation secondary to atherosclerotic diabetic hypertensive heart disease.Paroxysmal in the past, atrial fibrillation is  chronic at this time.  In 5/18 sinus bradycardia with Mobitz type I second-degree AV block was noted. Marked bradycardia  (40/minute) led to discontinuation of all AV brigid blocking agents/beta blockers/metoprolol in 12/19.. A 7/20 Zio patch/mobile telemetry study demonstrated atrial fibrillation with rate variation /minute and an average of 49/minute. There were  no symptoms. The controlled l to slow ventricular response in atrial fibrillation in the absence of any AV brigid blocking agent is consistent with a diagnosis of AV brigid sclerosis. . An elevated "NANCY [id number]" score is indicative of an increased risk of systemic/cerebral emboli. Factor Xa . inhibitor therapy is indicated. \par \par \par I have recommended the following:\par 1.No further cardiac testing for this problem at this time\par 2 continue the present medical regimen \par \par \par \par \par \par Atherosclerotic heart disease\par Daniel has known atherosclerotic heart disease. In 9/07 he underwent coronary artery bypass graft surgery with a robotic-assisted LIMA-LAD. Recurrent symptoms and stenosis of the anastomosis of the LIMA-LAD led to a second operation in 11/07. The operation consisted of a  free LAURE-diagonal/LAD and  saphenous vein grafts to OM1 and OM 3. The right coronary artery was nonobstructive.  \par The 3/22 cardiac catheterization demonstrated severe native multivessel disease. The left main had a 70% stenosis. The LAD was 100% occluded in its midportion and filled with a patent LAURE graft. The left circumflex was 99% stenotic in its proximal portion and filled with a patent saphenous vein graft. The RCA was 100% occluded.\par \par \par  Left ventricle systolic function  had been  normal as reflected by a left ventricle ejection fraction of 66% on the 7/07 left ventriculogram   67% on the 5/18  gaited sesta mibi study and 61 % on the  7/20 echocardiogram.\par \par Left ventricular systolic function is now markedly depressed reflected by a left ventricular ejection fraction 23% - 35 %  on the 3/22 echocardiograms .\par \par I have recommended the following\par a. Risk factor modification\par b. No further cardiac testing for this problem at this time.\par \par \par \par \par \par Valvular heart disease\par The 3/22  echocardiogram demonstrated   mild aortic stenosis with a peak gradient of 27 mmHg mean gradient 16 mmHg and aortic valve area of 1.4 cm².  and   mild -moderate    mitral /tricuspid  regurgitation . Pulmonary artery systolic pressure  was  30  mmHg.The left atrium was enlarged at 4.8 cm. The left ventricle was dilated with an end-diastolic dimension of 6.0 cm  The left ventricle ejection fraction was 35 %. The present cardiac physical examination is not suggestive of severe aortic stenosis or mitral regurgitation.\par \par I have recommended the following\par a.  no further cardiac testing for this problem at this time\par \par \par \par \par \par Hypertension\par Hypertension has been severe and difficult to control. Problems with hyperkalemia/renal insufficiency while on lisinopril have  previously prevented the administration of  ACE- I./ARB therapy. Concerns for prior Mobitz type I second-degree AV block /bradycardia preclude  beta blockers. .\par \par I have recommended the following\par 1. Low-salt low-fat low-cholesterol diabetic diet. Regular aerobic exercise\par 2. Laboratory studies to rule out hyperkalemia ordered\par 3. Home blood pressure monitoring\par 4  Further medical regimen adjustment dictated by the above and the patient's clinical course\par \par \par \par \par Hyperlipidemia\par Hyperlipidemia represents a risk factor for progressive atherosclerotic disease. Target LDL level with known atherosclerotic heart disease is about 70. Intolerance of HMG coA reductase inhibitor therapy has prevented its administration. In 3/22  the serum cholesterol level was 143  triglycerides 69  HDL 48  and LDL 81 . Recent data has indicated the benefit of PCSK9 inhibition therapy in patients in whom  statin therapy was either ineffective or  not tolerated. \par Nonpharmacological therapy, specifically diet and exercise are emphasized  as  major aspects of treatment.\par \par \par I have recommended the following:\par 1. Low-salt low-fat low-cholesterol diet. Regular aerobic exercise\par 2 continue the present medical regimen\par 3  consideration for PCSK9 inhibition therapy dependent on followup lipid levels as discussed above\par \par \par \par \par Carotid stenosis\par In 6/13  Daniel  underwent a right carotid endarterectomy for  a  high-grade stenosis.  A left carotid bruit audible today was not appreciated on the previous physical examination. The last ultrasound study available at this time is an  8/16 study demonstrating a patent internal right carotid artery. The left carotid artery had plaque without an obstructing lesion.\par \par I have  recommended the following\par a. Vascular surgical followup Dr Garcia  with anticipation of an updated carotid artery ultrasound/duplex study\par \par \par \par Peripheral vascular disease\par A 3/22 lower extremity arterial Doppler study failed to demonstrate any evidence of arterial occlusion However extensive atherosclerotic calcification were noted.\par \par I have recommended the following\par a. Risk factor modification\par b. Vascular surgical followup Dr. Garcia\par \par \par \par The diagnosis, prognosis, risks, options and alternatives were explained at length to the patient and family.t. All questions were answered. Issues discussed included  heart failure hyperkalemia  bradycardia atherosclerotic heart disease, hyperlipidemia, hypertension ,carotid stenosis cardioembolic stroke hyperlipidemia  anticoagulation  noninvasive cardiac l testing  diet/exercise. and right/left heart catheterization \par \par \par \par Counseling and/or coordination of care\par Time was a significant factor for this patient encounter. Total time spent with the patient was 40   minutes. 50% of the time was devoted to counseling and/or coordination of care

## 2022-03-22 NOTE — HISTORY OF PRESENT ILLNESS
[FreeTextEntry1] : 81-year-old man\par Routine followup of hospital\[facility] \par Cardiac catheterization was performed on 3/17/22.   The bypass grafts were  patent with severe native disease. The RCA which was patent at the time of coronary bypass surgery is occluded. The right heart catheterization  revealed a pulmonary capillary wedge pressure of 2 mmHg.  (  see attached report )\par \mayur Sanchez complains of generalized weakness of the right lower extremity. No chest pain. No ankle edema. No orthopnea. No syncope. \par \mayur Sanchez is accompanied today by his wife.

## 2022-03-28 PROBLEM — Z82.5 FAMILY HISTORY OF CHRONIC OBSTRUCTIVE PULMONARY DISEASE: Status: ACTIVE | Noted: 2018-05-07

## 2022-03-28 PROBLEM — I48.20 CHRONIC ATRIAL FIBRILLATION: Status: ACTIVE | Noted: 2019-12-10

## 2022-03-28 PROBLEM — Z87.891 FORMER SMOKER: Status: ACTIVE | Noted: 2018-05-07

## 2022-03-28 PROBLEM — I10 HTN (HYPERTENSION): Status: ACTIVE | Noted: 2022-01-01

## 2022-03-28 PROBLEM — Z82.5 FAMILY HISTORY OF EMPHYSEMA: Status: ACTIVE | Noted: 2018-11-19

## 2022-03-28 PROBLEM — E78.5 HYPERLIPIDEMIA: Status: ACTIVE | Noted: 2020-08-11

## 2022-03-28 PROBLEM — I25.10 CAD (CORONARY ARTERY DISEASE): Status: ACTIVE | Noted: 2018-04-25

## 2022-03-28 PROBLEM — I50.9 CHF (CONGESTIVE HEART FAILURE): Status: ACTIVE | Noted: 2022-01-01

## 2022-03-28 PROBLEM — E11.9 DIABETES: Status: ACTIVE | Noted: 2022-01-01

## 2022-03-28 PROBLEM — Z86.79 HISTORY OF HEART VALVE ABNORMALITY: Status: RESOLVED | Noted: 2018-05-07 | Resolved: 2022-01-01

## 2022-03-28 NOTE — PLAN
[FreeTextEntry1] : 1) CHF\par - Continue on lasix daily\par - Continue with daily weights\par - Advised to call for an increase of greater than 2 lbs in a day or 5 lbs in a week.\par - Adhere to a low salt diet: educated on foods that should be avoided such as processed or fast foods.\par - Limit fluids to 1.5 liters per day.\par - Follow-up with cardiologist\par - Contact information given and patient advised to call with any questions or concerns.\par \par 2) A.fib\par - Continue on eliquis 5mg BID\par - Evaluation for pacemaker\par \par 3) HTN\par - Continue on Norvasc\par - Continue monitor at home\par \par 4) Diabetes\par - Continue on metformin\par \par 5) HLD\par - Continue on ezetimibe\par \par Follow-up with PCP and cardiology as scheduled.

## 2022-03-28 NOTE — REVIEW OF SYSTEMS
[Fever] : no fever [Chills] : no chills [Fatigue] : no fatigue [Chest Pain] : no chest pain [Palpitations] : no palpitations [Lower Ext Edema] : no lower extremity edema [Abdominal Pain] : no abdominal pain [Nausea] : no nausea [Constipation] : no constipation [Diarrhea] : no diarrhea [Vomiting] : no vomiting [Headache] : no headache [Dizziness] : no dizziness [Unsteady Walk] : no ataxia [Negative] : Integumentary

## 2022-03-28 NOTE — HISTORY OF PRESENT ILLNESS
[Spouse] : spouse [FreeTextEntry1] : Follow-up for discharge from [facility] for CHF\par  [de-identified] : Patient is a 81 year y/o male enrolled in the STARS program with a history of CAD s/p CABG x4, Bladder CA, DM, A.fib on Eliquis recently admitted from 3/2/22-3/4/22 to [facility] for CHF.   Patient presented to the ER with ROSENBERG and orthopnea. Elevated BNP and CXR with venous congestion in ED. Cardiology was consulted. Echo performed and showed ejection fraction of 23%. He was started on lasix and entresto. BB avoided given low heart rates during admission. Supplemental oxygen as needed during hospitalization however SOB improved with diuresis and pt was saturating well on room air. Discharged on 3/4 with outpatient cardio follow up.  Patient had outpatient cardiac cath on 3/17, with CAD and patent vessels.  No new stents.\par \par Patient evaluated in home and on arrival patient alert and oriented x3, and in no acute distress.  Patient states he is feeling well, and with no shortness of breath or chest pain.  Patient states PT has been seeing him and wants to get stronger.  Patient has appointment with Dr. Hager (cardiology) to evaluate for pacemaker. Patient has scale in home, and states his weight is 143.2lbs, and has consistently been between 143-146lbs.  Patient states they stopped the entresto after the cardiac cath. CHF education given including low sodium diet, daily weights, and 1.5 liter fluid restriction.  Yellow zones discussed and when to notify the NP or MD. Patient has scale in home. Patient denies chest pain, palpitations, shortness of breath, abdominal pain, nausea, vomiting, diarrhea, lightheaded or dizziness.\par \par \par

## 2022-03-28 NOTE — PHYSICAL EXAM
[No Acute Distress] : no acute distress [Well Nourished] : well nourished [Well Developed] : well developed [Well-Appearing] : well-appearing [Normal Sclera/Conjunctiva] : normal sclera/conjunctiva [Normal Outer Ear/Nose] : the outer ears and nose were normal in appearance [Supple] : supple [No Respiratory Distress] : no respiratory distress  [No Accessory Muscle Use] : no accessory muscle use [Clear to Auscultation] : lungs were clear to auscultation bilaterally [Bradycardia] : bradycardic [Irregularly Irregular] : irregularly irregular [No Edema] : there was no peripheral edema [Soft] : abdomen soft [Non Tender] : non-tender [Non-distended] : non-distended [Normal Bowel Sounds] : normal bowel sounds [No CVA Tenderness] : no CVA  tenderness [No Rash] : no rash [No Focal Deficits] : no focal deficits [Normal Gait] : normal gait [Speech Grossly Normal] : speech grossly normal [Memory Grossly Normal] : memory grossly normal [Normal Affect] : the affect was normal [Alert and Oriented x3] : oriented to person, place, and time [Normal Mood] : the mood was normal [Normal Insight/Judgement] : insight and judgment were intact

## 2022-03-28 NOTE — ASSESSMENT
[FreeTextEntry1] : Patient is a 81 year y/o male enrolled in the [facility] program with a history of CAD s/p CABG x4, Bladder CA, DM, A.fib on Eliquis recently admitted from 3/2/22-3/4/22 to [facility] for CHF.   Patient presented to the ER with ROSENBERG and orthopnea. Elevated BNP and CXR with venous congestion in ED. Cardiology was consulted. Echo performed and showed ejection fraction of 23%. He was started on lasix and entresto. BB avoided given low heart rates during admission. Supplemental oxygen as needed during hospitalization however SOB improved with diuresis and pt was saturating well on room air. Discharged on 3/4 with outpatient cardio follow up.  Patient had outpatient cardiac cath on 3/17, with CAD and patent vessels.  No new stents.\par \par Patient evaluated in home and on arrival patient alert and oriented x3, and in no acute distress.  Patient states he is feeling well, and with no shortness of breath or chest pain.  Patient states PT has been seeing him and wants to get stronger.  Patient has appointment with Dr. Hager (cardiology) to evaluate for pacemaker. Patient has scale in home, and states his weight is 143.2lbs, and has consistently been between 143-146lbs.  Patient states they stopped the entresto after the cardiac cath. CHF education given including low sodium diet, daily weights, and 1.5 liter fluid restriction.  Yellow zones discussed and when to notify the NP or MD. Patient has scale in home. Patient denies chest pain, palpitations, shortness of breath, abdominal pain, nausea, vomiting, diarrhea, lightheaded or dizziness.\par

## 2022-04-04 NOTE — H&P ADULT - PROBLEM SELECTOR PLAN 5
F/u fasting lipid panel   - Continue home: Difficulty controlling HTN due to hx of hyperkalemia and bradycardia  - Home meds: lasix 40 mg once daily

## 2022-04-04 NOTE — H&P ADULT - PROBLEM SELECTOR PLAN 3
Hx of bladder cancer, currently undergoing therapy (   ) hgb/hct on admission: 11.5/35.7 appearing stable from recent admissions  - F/u iron studies  - likely would benefit from IV iron if iron deficient

## 2022-04-04 NOTE — H&P ADULT - PROBLEM SELECTOR PLAN 6
Hx of DM, home meds:   - Continue MISS F/u fasting lipid panel   - Home meds: Zetia 10 mg once daily  - Consider PCSK9 inhibitor upon dc

## 2022-04-04 NOTE — H&P ADULT - NSICDXPASTSURGICALHX_GEN_ALL_CORE_FT
PAST SURGICAL HISTORY:  H/O carotid endarterectomy     H/O foot surgery     H/O laminectomy cervical    H/O shoulder surgery     History of bladder surgery     S/P CABG x 1     S/P cataract surgery

## 2022-04-04 NOTE — H&P ADULT - HISTORY OF PRESENT ILLNESS
Mr. Freire is 82 yo M with HTN, HLD, PVD, permanent atrial fibrillation, CAD s/p CABG, and ischemic cardiomyopathy recently hospitalized with HF exacerbation. Cardiac cath at time revealed patent grafts and EF noted to be 23%. Although he was started on BB and entresto for HF, they had to discontinued due to bradycardia and hyperkalemia. He is now transferred from [facility] to [facility] with plans to implant BIV ppm or ICD likely on Wednesday.     Mr. Freire is 80 yo M with HTN, HLD, PVD, chronic atrial fibrillation, CAD s/p CABG, and ischemic cardiomyopathy recently hospitalized with HF exacerbation. Cardiac cath at time revealed patent grafts and EF noted to be 23%. Although he was started on BB and entresto for HF, they had to discontinued due to bradycardia and hyperkalemia. He is now transferred from  to [facility] with plans to implant BIV ppm or ICD likely on Wednesday.   Mr. Freire is 80 yo M with HTN, HLD, DM, PVD, chronic atrial fibrillation (rate controlled, on Eliquis), CAD s/p CABG x 4 (LIMA to LAD 09/07 with revision 11/07 free LAURE-diagonal/LAD and saphenous vein grafts to OM1 and OM 3),  ischemic cardiomyopathy (EF: 23% 03/2022), bladder cancer (follows with Dr. Farley on active chemo), who presented to [facility] ED on 4/01/22 with progressive dyspnea x 1 week. Patient reports compliance with all his medications, but recently had his Lasix dose decreased by his cardiologist, Dr. Arriola. Patient states that he has slowly developed more difficulty ambulating due to pronounced fatigue. Patient states that he is has trouble laying flat and is "unable to catch his breath." Patient's dyspnea was improved by sitting up in bed. Patient admitted to cardiology/telemetry and continued on Lasix IV throughout his admission. Patient is now being transferred to [facility] for Implantation of device therapy, AICD biventricular pacemaker/resynchronization. Patient denies palpitations, dizziness, headache, fever, chills, n/v/d, recent travel, or sick contacts.     Upon arrival to [facility],   VS:      Mr. Freire is 82 yo M with HTN, HLD, DM, PVD, chronic atrial fibrillation (rate controlled, on Eliquis), CAD s/p CABG x 4 (LIMA to LAD 09/07 with revision 11/07 free LAURE-diagonal/LAD and saphenous vein grafts to OM1 and OM 3),  ischemic cardiomyopathy (EF: 23% 03/2022 ), bladder cancer (follows with Dr. Farley s/p BCG washes), who presented to [facility] ED on 4/01/22 with progressive dyspnea x 1 week. Of note, patient admitted to [place] 03/02/22 for acute on chronic systolic HF exacerbation. Patient underwent IV diuresis, discharged home and then saw his cardiologist, Dr. Arriola who d/c Entresto and dec Lasix from 40 mg BID to Lasix 40 mg daily due to hyperkalemia and bradycardia.   Patient states that since this, he has slowly developed more difficulty ambulating due to pronounced fatigue. Patient states that he is has trouble laying flat and is "unable to catch his breath." Patient's dyspnea was improved by sitting up in bed. During his hospital stay at [place] from 04/01/22-04/04/22, patient underwent IV diuresis with Lasix.  Patient is now being transferred to [facility] for Implantation of device therapy, AICD biventricular pacemaker/resynchronization. Patient denies palpitations, dizziness, headache, fever, chills, n/v/d, recent travel, or sick contacts. Upon arrival to [facility], patient's VSS. Appearing alert, comfortable, states his SOB has improved.

## 2022-04-04 NOTE — PATIENT PROFILE ADULT - FALL HARM RISK - UNIVERSAL INTERVENTIONS
Bed in lowest position, wheels locked, appropriate side rails in place/Call bell, personal items and telephone in reach/Instruct patient to call for assistance before getting out of bed or chair/Non-slip footwear when patient is out of bed/[place] to call system/Physically safe environment - no spills, clutter or unnecessary equipment/Purposeful Proactive Rounding/Room/bathroom lighting operational, light cord in reach

## 2022-04-04 NOTE — H&P ADULT - PROBLEM SELECTOR PLAN 1
Patient recently admitted to [facility] for SOB, dyspnea on exertion, orthopnea   - Patient underwent echo 03/2022 that revealed an EF of 23-35%  - Repeat echo ordered   - Medical therapy for heart failure/left ventricular systolic dysfunction is limited at this time.   Bradycardia and a prior history of Mobitz I heart block in the past (when in sinus rhythm)   preclude the administration of beta blockers. Prior hyperkalemia thought to be related to renal   tubular acidosis, exacerbated by the administration of lisinopril, led to discontinuation of   ACE/ARB therapy. Likewise, the administration of Entresto had to be discontinued due to   hyperkalemia (5.8 m/mol).  - Patient to undergo  Implantation of device therapy, AICD biventricular pacemaker/resynchronization would allow for the administration of beta blockers.   - Continue:   - Strict I's and O's, core measures, daily weights Patient presented to [facility] on 4/01/22 - 04/04/22 with +orthopnea, +inc SOB after dec Lasix dose, s/p IV diuresis   - ECHO revealed an EF of 23-35%  - Repeat echo ordered   - Patient previously on Entresto, however dec due to hyperkalemia   - S/p Cardiac cath 3/22 at [facility]: LM: 70% stenosis, LAD: 100% stenosis, fills via patent LAURE and D1, LCx: 99% stenosis, fills via patent SVG, pRCA: 100% stenosis, no graft found   - Appears near euvolemic, will give additional Lasix 40 mg IVP x 1  - Assess in AM volume status if to switch to oral Lasix (home dose: Lasix 40 mg once daily)   - Will undergo BIV ppm or ICD on 4/6/22 by EP - will consent tomorrow 4/5/22; repeat COVID sent 4/4/22  - Strict I's and O's, core measures, daily weights

## 2022-04-04 NOTE — H&P ADULT - PROBLEM SELECTOR PLAN 8
Hx of CAD, CABG x 4 in past   - S/p Cardiac cath 3/22 at [facility]: LM: 70% stenosis, LAD: 100% stenosis, fills via patent LAURE and D1, LCx: 99% stenosis, fills via patent SVG, pRCA: 100% stenosis, no graft found   - Continue aspirin 81 mg once daily     F: none  E: K >4, Mg > 2  N: DASH/TLC, fluid restriction   Dvt: Eliquis  Dispo: pending diuresis and device placement by EP

## 2022-04-04 NOTE — H&P ADULT - ASSESSMENT
Mr. Freire is 80 yo M with HTN, HLD, PVD, permanent atrial fibrillation, CAD s/p CABG, and ischemic cardiomyopathy recently hospitalized with HF exacerbation. Cardiac cath at time revealed patent grafts and EF noted to be 23%. Although he was started on BB and entresto for HF, they had to discontinued due to bradycardia and hyperkalemia. He is now transferred from [facility] to [facility] with plans to implant BIV ppm or ICD likely on Wednesday.    - EKG  - TTE   - DASH diet; NPO tomorrow after midnight  - LAbs, as ordered  - needs consent   Mr. Freire is 80 yo M with HTN, HLD, PVD, chronic atrial fibrillation, CAD s/p CABG, and ischemic cardiomyopathy recently hospitalized with HF exacerbation. Cardiac cath at time revealed patent grafts and EF noted to be 23%. Although he was started on BB and entresto for HF, they had to discontinued due to bradycardia and hyperkalemia. He is now transferred from  to [facility] with plans to implant BIV ppm or ICD likely on Wednesday.    [ ] TTE  [ ] EKG  [ ] NPO tuesday night for possible BIV PPM vs ICD on Wednesday  [ ] fluid restrict 1.5 L  [ ] cont home lasix  [ ] on home metformin; start on insulin sliding scale  [ ] cont eliquis 5mg q12h  [ ] Labs, as ordered  [ ] needs consent  [ ] Med rec incomplete (pt not here yet)   Mr. Freire is 82 yo M with HTN, HLD, DM, PVD, chronic atrial fibrillation (rate controlled, on Eliquis), CAD s/p CABG x 4 (LIMA to LAD 09/07 with revision 11/07 free LAURE-diagonal/LAD and saphenous vein grafts to OM1 and OM 3),  ischemic cardiomyopathy (EF: 23% 03/2022 ), bladder cancer (follows with Dr. Farley s/p BCG washes), who presented to [facility] ED on 4/01/22 with progressive dyspnea x 1 week. Patient recently admitted to [place] for similiar acute on chronic systolic HF exacerbation. Patient underwent diuresis with IV Lasix. Patient now transferred to [facility] for device therapy, AICD biventricular pacemaker/resynchronization, likely on Wednesday 4/6/22.

## 2022-04-04 NOTE — H&P ADULT - PROBLEM SELECTOR PLAN 4
Hx of htn, home meds:   - Continue Hx of bladder cancer, s/p BCG washes   - will need outpatient f/u with Dr. Farley upon dc

## 2022-04-04 NOTE — H&P ADULT - PROBLEM SELECTOR PLAN 2
Hx of chronic atrial fibrillation  - Continue Eliquis 5 mg BID, Hx of chronic atrial fibrillation  - Continue Eliquis 5 mg BID, not on rate control due to bradycardia  - Plan for ICD or BIV ppm this admission   - Telemetry monitoring

## 2022-04-04 NOTE — H&P ADULT - PROBLEM SELECTOR PLAN 7
Hx of CAD, CABG x 4 in past   - 09/2007 he underwent coronary artery bypass graft surgery with a robotic-assisted LIMA to the LAD. Recurrent symptoms and stenosis of the anastomosis of the LIMA to the LAD led to a second operation in 11/2007. The operation consisted of free LIMA-diagonal/LAD and saphenous vein grafts to OM1 and OM3. The right coronary artery was nonobstructive.   - In 03/2022, cardiac catheterization demonstrated severe multi-vessel native disease with 100% occlusion of the LAD filling with a patent LIMA graft. The left circumflex was 99% stenotic in its proximal portion and filled with a patent saphenous vein graft. The RCA was 100% occluded. Left ventricular systolic function had been normal in the past   reflected by an ejection fraction of 61% on a 07/20 echocardiogram. Left ventricular systolic function is now markedly depressed reflected by left ventricular ejection fraction of 23-35% on 03/22 echocardiograms.   - Continue:    F:   E: K >4, Mg > 2  N: DASH/TLC, fluid restriction   Dvt: Eliquis   GI:   Dispo: pending device placement by EP Hx of DM, home meds: Metformin 1000 mg BID   - Continue MISS

## 2022-04-04 NOTE — H&P ADULT - NSICDXPASTMEDICALHX_GEN_ALL_CORE_FT
PAST MEDICAL HISTORY:  CAD (coronary atherosclerotic disease)     CHF exacerbation     Diabetes mellitus     Diverticulosis     H/o Lyme disease     HLD (hyperlipidemia)     HTN (hypertension)     Mitral regurgitation     Mobitz type 1 second degree atrioventricular block     PVD (peripheral vascular disease)

## 2022-04-05 NOTE — PROGRESS NOTE ADULT - SUBJECTIVE AND OBJECTIVE BOX
EPS Progress Note    S: Pt assessed at the bedside. He feels well. Reports that sob has resolved. Denies c/p, lightheadedness, LE and syncope.     T(C): 36.9 (04-05-22 @ 09:10), Max: 36.9 (04-05-22 @ 09:10)  HR: 64 (04-05-22 @ 09:13) (58 - 75)  BP: 102/57 (04-05-22 @ 09:13) (100/50 - 120/55)  RR: 20 (04-05-22 @ 09:13) (18 - 22)  SpO2: 98% (04-05-22 @ 09:13) (95% - 98%)     Telemetry: AF 45-60 bpm             Constitutional: No acute Distress  Psych: Normal affect, normal mood  Neuro: A/o x 3, No focal deficits  Neck: Supple, NO JVD  CVS: S1 S2, No M/R/G  Pulmonary: CTAB, Breathing unlabored, No Rhonchi/Rales/Wheezing  GI: Soft, Non -tender, +BS x 4 quads  Skin: No rash, warm and dry, no erythematous areas   MSK: 5/5 strength, normal range of motion, no swollen or erythematous joints.      MEDICATIONS  (STANDING):  apixaban 5 milliGRAM(s) Oral two times a day  ascorbic acid 500 milliGRAM(s) Oral daily  aspirin enteric coated 81 milliGRAM(s) Oral daily  cyanocobalamin 1000 MICROGram(s) Oral daily  furosemide    Tablet 40 milliGRAM(s) Oral daily  glucagon  Injectable 1 milliGRAM(s) IntraMuscular once  insulin lispro (ADMELOG) corrective regimen sliding scale   SubCutaneous three times a day before meals  multivitamin/minerals 1 Tablet(s) Oral daily    MEDICATIONS  (PRN):  dextrose Oral Gel 15 Gram(s) Oral once PRN Blood Glucose LESS THAN 70 milliGRAM(s)/deciliter                                 LABS:                        12.0   7.65  )-----------( 227      ( 05 Apr 2022 08:11 )             37.3     04-05    138  |  101  |  34<H>  ----------------------------<  109<H>  4.1   |  25  |  1.27    Ca    9.4      05 Apr 2022 08:11  Mg     2.1     04-05    TPro  7.5  /  Alb  3.5  /  TBili  0.9  /  DBili  x   /  AST  120<H>  /  ALT  93<H>  /  AlkPhos  192<H>  04-05    PT/INR - ( 04 Apr 2022 19:16 )   PT: 15.5 sec;   INR: 1.30       PTT - ( 04 Apr 2022 19:16 )  PTT:31.6 sec    Serum Pro-Brain Natriuretic Peptide (04.05.22 @ 08:11)    Serum Pro-Brain Natriuretic Peptide: 18907       EPS Progress Note    S: Pt assessed at the bedside. He feels well. Reports that sob has resolved. Denies c/p, lightheadedness, LE and syncope.     T(C): 36.9 (04-05-22 @ 09:10), Max: 36.9 (04-05-22 @ 09:10)  HR: 64 (04-05-22 @ 09:13) (58 - 75)  BP: 102/57 (04-05-22 @ 09:13) (100/50 - 120/55)  RR: 20 (04-05-22 @ 09:13) (18 - 22)  SpO2: 98% (04-05-22 @ 09:13) (95% - 98%)     Telemetry: AF 45-60 bpm             Constitutional: No acute Distress  Psych: Normal affect, normal mood  Neuro: A/o x 3, No focal deficits  Neck: Supple, NO JVD  CVS: S1 S2, No M/R/G  Pulmonary: CTAB, Breathing unlabored, No Rhonchi/Rales/Wheezing  GI: Soft, Non -tender, +BS x 4 quads  Skin: No rash, warm and dry, no erythematous areas   MSK: 5/5 strength, normal range of motion, no swollen or erythematous joints.      MEDICATIONS  (STANDING):  apixaban 5 milliGRAM(s) Oral two times a day  ascorbic acid 500 milliGRAM(s) Oral daily  aspirin enteric coated 81 milliGRAM(s) Oral daily  cyanocobalamin 1000 MICROGram(s) Oral daily  furosemide    Tablet 40 milliGRAM(s) Oral daily  glucagon  Injectable 1 milliGRAM(s) IntraMuscular once  insulin lispro (ADMELOG) corrective regimen sliding scale   SubCutaneous three times a day before meals  multivitamin/minerals 1 Tablet(s) Oral daily    MEDICATIONS  (PRN):  dextrose Oral Gel 15 Gram(s) Oral once PRN Blood Glucose LESS THAN 70 milliGRAM(s)/deciliter                                 LABS:                        12.0   7.65  )-----------( 227      ( 05 Apr 2022 08:11 )             37.3     04-05    138  |  101  |  34<H>  ----------------------------<  109<H>  4.1   |  25  |  1.27    Ca    9.4      05 Apr 2022 08:11  Mg     2.1     04-05    TPro  7.5  /  Alb  3.5  /  TBili  0.9  /  DBili  x   /  AST  120<H>  /  ALT  93<H>  /  AlkPhos  192<H>  04-05    PT/INR - ( 04 Apr 2022 19:16 )   PT: 15.5 sec;   INR: 1.30       PTT - ( 04 Apr 2022 19:16 )  PTT:31.6 sec    Serum Pro-Brain Natriuretic Peptide (04.05.22 @ 08:11)    Serum Pro-Brain Natriuretic Peptide: 21153    Vitamin B12, Serum in AM (04.05.22 @ 12:28)    Vitamin B12, Serum: >2000 pg/mL

## 2022-04-05 NOTE — PROGRESS NOTE ADULT - PROBLEM SELECTOR PLAN 2
HFrEF most recent EF 23%. Echo repeated today - read pending. pro-BNP elevated [id number]. He received lasix 40mg IVP yesterday. Today on exam he is euvolemic. Lasix switched to 40mg PO daily (home dose).   - Continue off BB and Entresto until ICD implant.   - BMP ordered for am.

## 2022-04-05 NOTE — PROGRESS NOTE ADULT - ASSESSMENT
Mr. Freire is an 82 yo M with HTN, HLD, DM, PVD, chronic atrial fibrillation (rate controlled, on Eliquis), CAD s/p CABG x 4 (LIMA to LAD 09/07 with revision 11/07 free LAURE-diagonal/LAD and saphenous vein grafts to OM1 and OM 3),  ischemic cardiomyopathy (EF: 23% 03/2022 ), bladder cancer (follows with Dr. Farley s/p BCG washes), who presented to [facility] ED on 4/01/22 with progressive dyspnea x 1 week. Of note, patient admitted to [place] 03/02/22 for acute on chronic systolic HF exacerbation. Patient underwent IV diuresis, discharged home and then saw his cardiologist, Dr. Arriola who d/c Entresto and dec Lasix from 40 mg BID to Lasix 40 mg daily due to hyperkalemia and bradycardia.   Patient states that since this, he has slowly developed more difficulty ambulating due to pronounced fatigue. Patient states that he is has trouble laying flat and is "unable to catch his breath." Patient's dyspnea was improved by sitting up in bed. During his hospital stay at [place] from 04/01/22-04/04/22, patient underwent IV diuresis with Lasix.

## 2022-04-06 NOTE — DIETITIAN INITIAL EVALUATION ADULT - ADD RECOMMEND
1. Monitor PO intake/appetite, GI distress, diet tolerance, labs, weights.  2. Honor pt food preferences as able.  3. MVI.   4. RD to remain available for additional nutrition interventions as needed.

## 2022-04-06 NOTE — DIETITIAN INITIAL EVALUATION ADULT - OTHER INFO
82 yo M with HTN, HLD, DM, PVD, chronic atrial fibrillation (rate controlled, on Eliquis), CAD s/p CABG x 4 (LIMA to LAD 09/07 with revision 11/07 free LAURE-diagonal/LAD and saphenous vein grafts to OM1 and OM 3), ischemic cardiomyopathy (EF: 23% 03/2022 ), bladder cancer (follows with Dr. Farley s/p BCG washes), who presented to [facility] ED on 4/01/22 with progressive dyspnea x 1 week. Of note, admitted to [place] 3/2/22 for acute on chronic systolic HF exacerbation; underwent IV diuresis, discharged home and then saw his cardiologist, Dr. Arriola who d/c Entresto and dec Lasix from 40 mg BID to Lasix 40 mg daily due to hyperkalemia and bradycardia. Transferred to [facility] for Implantation of device therapy, AICD biventricular pacemaker/resynchronization (likely on Wednesday 4/6/22).

## 2022-04-06 NOTE — DIETITIAN INITIAL EVALUATION ADULT - DIET TYPE
Diet to be advanced in 24-48hrs: Low sodium diet, can d/c Consistent Carbohydrate d/t preDM A1c in pt with poor PO. Add oral nutrition supplement: Glucerna x2/day 220kcal/10gm prot per 1 can.

## 2022-04-06 NOTE — CHART NOTE - NSCHARTNOTEFT_GEN_A_CORE
Pre-op Diagnosis:  Ischemic cardiomyopathy, EF 20 %  Long standing persistent Afib with slow ventricular rate    Post-op Diagnosis:  Same     Procedure:  Biv ICD implantation     Electrophysiologist:  Dr Alley MD     Fellow:  BREANN Jiang MD     Anesthesia:  MAC    Access:  Left cephalic vein cutdown   Left axillary vein stick     Description:  Successful implantation of a Biv ICD (RV and LV lead, no RA lead given long standing persistent afib).    Complications:  None     EBL:  < 30 ml     Disposition:  stable, to recovery     Plan:  Bed rest for 2 hours.  EKG post procedure.  CXR PA and Lateral tomorrow AM   Restart apixaban on 4/8/22.

## 2022-04-06 NOTE — DIETITIAN INITIAL EVALUATION ADULT - PERTINENT MEDS FT
MEDICATIONS  (STANDING):  ascorbic acid 500 milliGRAM(s) Oral daily  cyanocobalamin 1000 MICROGram(s) Oral daily  furosemide    Tablet 40 milliGRAM(s) Oral daily  glucagon  Injectable 1 milliGRAM(s) IntraMuscular once  insulin lispro (ADMELOG) corrective regimen sliding scale   SubCutaneous Before meals and at bedtime  multivitamin/minerals 1 Tablet(s) Oral daily

## 2022-04-06 NOTE — DIETITIAN INITIAL EVALUATION ADULT - PERTINENT LABORATORY DATA
low HH  POCT 103 157 106  BUN 34, Cr WDL  ALK PHOS 192  AST SGOT 120  ALT SGPT 93  A1c 6.3%  Lipids WDL  BNP 58601

## 2022-04-06 NOTE — DIETITIAN INITIAL EVALUATION ADULT - ORAL INTAKE PTA/DIET HISTORY
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 Does try to take 1-2 glucerna/day as oral nutrition supplement. Despite decreased PO, does try to follow low sodium/potassium diet (seems well versed in diet). Assume meeting<75% EER PTA.

## 2022-04-07 NOTE — PROGRESS NOTE ADULT - SUBJECTIVE AND OBJECTIVE BOX
24 hour events: s/p BiV ICD (RV/LV lead placement) [employer].   No events overnight.  Dressing removed this AM- without hematoma or drainage.  Holding Eliquis.       Appearance: Normal	  HEENT:   Normal oral mucosa, PERRL, EOMI	  Neck: Supple  Cardiovascular: paced rhythm,  No JVD  Respiratory: Lungs clear to auscultation  Left chest wall: Incision clean dry intact, well approximated, no hematoma, erythema or drainage  Gastrointestinal:  Soft, Non-tender, + BS	  Extremities: BLE warm, dry no edema  Neurologic: Non-focal  Psychiatry: A & O x 3, Mood & affect appropriate        Vital Signs Last 24 Hrs  T(C): 36.9 (07 Apr 2022 09:10), Max: 36.9 (07 Apr 2022 09:10)  T(F): 98.4 (07 Apr 2022 09:10), Max: 98.4 (07 Apr 2022 09:10)  HR: 76 (07 Apr 2022 08:29) (50 - 88)  BP: 112/62 (07 Apr 2022 08:29) (101/56 - 122/63)  BP(mean): 80 (07 Apr 2022 08:29) (72 - 93)  RR: 18 (07 Apr 2022 08:29) (9 - 28)  SpO2: 95% (07 Apr 2022 08:29) (94% - 98%)                          13.1   10.41 )-----------( 264      ( 06 Apr 2022 08:44 )             41.6       04-06    138  |  100  |  36<H>  ----------------------------<  105<H>  4.1   |  24  |  1.25    Ca    9.6      06 Apr 2022 08:44    MEDICATIONS  (STANDING):  ascorbic acid 500 milliGRAM(s) Oral daily  aspirin enteric coated 81 milliGRAM(s) Oral daily  cyanocobalamin 1000 MICROGram(s) Oral daily  dextrose 5%. 1000 milliLiter(s) (50 mL/Hr) IV Continuous <Continuous>  dextrose 5%. 1000 milliLiter(s) (100 mL/Hr) IV Continuous <Continuous>  dextrose 50% Injectable 25 Gram(s) IV Push once  dextrose 50% Injectable 12.5 Gram(s) IV Push once  dextrose 50% Injectable 25 Gram(s) IV Push once  furosemide    Tablet 40 milliGRAM(s) Oral daily  glucagon  Injectable 1 milliGRAM(s) IntraMuscular once  insulin lispro (ADMELOG) corrective regimen sliding scale   SubCutaneous Before meals and at bedtime  metoprolol succinate ER 25 milliGRAM(s) Oral daily  multivitamin/minerals 1 Tablet(s) Oral daily                        EKG:

## 2022-04-07 NOTE — PROGRESS NOTE ADULT - PROBLEM SELECTOR PLAN 1
Chronic AF w/ slow VR, rates 45-60 bpm OFF AV brigid agents.   - s/p BiV ICD  - hold eliquis, restart tomorrow 4/8
Chronic AF w/ slow VR, rates 45-60 bpm OFF AV brigid agents.   - Plan for BIV ICD implant tomorrow. The patient has AF with a narrowly conducted QRS (102ms), however, given predicted amount of pacing and history of heart failure, we will place a BIV device to prevent dyssynchrony and worsening disease. Long discussion had on the merits between ICD vs PPM. He is interested in an ICD understanding that the device will deliver a shock to attempt to prolong his life should he go into VT or VF. The procedure, along with associated risks, including infection, bleeding, pneumothorax, and perforation were explained. All questions answered and informed consent signed.   - NPO after midnight.  - Continue Eliquis.

## 2022-04-07 NOTE — PROGRESS NOTE ADULT - ASSESSMENT
Daniel Freire is an 82 yo M with hx of HTN, HLD, DM, PVD, chronic AFB (rate controlled, on Eliquis), CAD s/p CABG x 4,  ischemic cardiomyopathy (EF 23% 3/22), bladder cancer, chronic systolic HF, not tolerate entresto due to hyperkalemia, SOB while laying flat, now s/p BiV ICD implantation on 4/6/22.

## 2022-04-07 NOTE — PROGRESS NOTE ADULT - PROBLEM SELECTOR PLAN 2
EF 23%.   - Restart Toprol XL 25mg daily   - euvolemic on exam, continue lasix 40mg PO daily Echocardiogram on 4/5 - EF 20%.   - Restart Toprol XL 25mg daily   - euvolemic on exam, continue lasix 40mg PO daily

## 2022-04-07 NOTE — PROGRESS NOTE ADULT - PROBLEM SELECTOR PLAN 7
- Slight anemia. Iron studies/ ferritin normal. No supplementation recommended.  - Vit B12 elevated, likely d/t over-supplementation.
- Slight anemia. Iron studies/ ferritin normal. No supplementation recommended.  - Vit B12 elevated, likely d/t over-supplementation.

## 2022-04-07 NOTE — PROGRESS NOTE ADULT - SUBJECTIVE AND OBJECTIVE BOX
EPS Device interrogation    Indication: post implant    Device model: 	IORevolution			Implanting Physician: Dr Yesica Hager   Functioning Mode: CRTD , VVI 75		    Underlying Rhythm: atrial fibrillation     Pacemaker dependency:  RV 77%, LV 99%    Battery status: 100% (SNEHAL)  Interrogating parameters:   				RA			RV			LV    Sense:                                   n/A                             15.2 mV                            10.1 mV                                                Threshold:                                N/A                             0.4 V @ 0.5 ms            1.4 V @ 0.5 ms                                                                      Pacing Impedance:                                                        459 ohms                     LVa 482 ohms,  LVb 597 ohms                                                        Shock Impedance:                                                         46 ohms                                                         Events/Alert:  none              Parameter change: None 	    For ICD only:  tachy therapy – unchanged

## 2022-04-07 NOTE — PROGRESS NOTE ADULT - PROBLEM SELECTOR PLAN 6
- Continue finger sticks with sliding scale while admitted.
- Continue finger sticks with sliding scale while admitted.

## 2022-04-07 NOTE — PROGRESS NOTE ADULT - PROBLEM SELECTOR PLAN 3
- No evidence of HTN during this admission OFF all antihypertensives. BP have been low-normal.
- restarted Beta blocker, monitor blood pressure

## 2022-04-07 NOTE — PROGRESS NOTE ADULT - PROBLEM SELECTOR PLAN 4
- Stable. No changes to treatment on this admission.
- Stable. No changes to treatment on this admission.

## 2022-04-08 PROBLEM — K57.90 DIVERTICULOSIS OF INTESTINE, PART UNSPECIFIED, WITHOUT PERFORATION OR ABSCESS WITHOUT BLEEDING: Chronic | Status: ACTIVE | Noted: 2022-01-01

## 2022-04-08 PROBLEM — Z86.19 PERSONAL HISTORY OF OTHER INFECTIOUS AND PARASITIC DISEASES: Chronic | Status: ACTIVE | Noted: 2022-01-01

## 2022-04-08 PROBLEM — I10 ESSENTIAL (PRIMARY) HYPERTENSION: Chronic | Status: ACTIVE | Noted: 2022-01-01

## 2022-04-08 PROBLEM — I34.0 NONRHEUMATIC MITRAL (VALVE) INSUFFICIENCY: Chronic | Status: ACTIVE | Noted: 2022-01-01

## 2022-04-08 PROBLEM — E78.5 HYPERLIPIDEMIA, UNSPECIFIED: Chronic | Status: ACTIVE | Noted: 2022-01-01

## 2022-04-08 PROBLEM — I25.10 ATHEROSCLEROTIC HEART DISEASE OF NATIVE CORONARY ARTERY WITHOUT ANGINA PECTORIS: Chronic | Status: ACTIVE | Noted: 2022-01-01

## 2022-04-08 PROBLEM — I50.9 HEART FAILURE, UNSPECIFIED: Chronic | Status: ACTIVE | Noted: 2022-01-01

## 2022-04-08 PROBLEM — I73.9 PERIPHERAL VASCULAR DISEASE, UNSPECIFIED: Chronic | Status: ACTIVE | Noted: 2022-01-01

## 2022-04-08 PROBLEM — I44.1 ATRIOVENTRICULAR BLOCK, SECOND DEGREE: Chronic | Status: ACTIVE | Noted: 2022-01-01

## 2022-04-08 PROBLEM — E11.9 TYPE 2 DIABETES MELLITUS WITHOUT COMPLICATIONS: Chronic | Status: ACTIVE | Noted: 2022-01-01

## 2022-04-08 NOTE — DISCHARGE NOTE PROVIDER - NSDCFUADDINST_GEN_ALL_CORE_FT
- You underwent a Biventricular Implantable Cardioverter Defibrillator (BiV-ICD) on 4/6/22.  The company that makes your device is called [employer]. Please bring the Home Remote Monitor home and plug it in. Keep it next to your bed.  It helps us to follow your device's function.   - Please call Dr. Hager's office in [facility] to set up an appointment with him in 1 month. Meanwhile, see Dr. Víctor Arriola within 1 week upon discharge home.   - We start you on Metoprolol ER 25 mg once daily. This is a heart medication for your cardiomyopathy. It's safe to take now that you have the new device. Future refill needs to be done via Dr. Arriola's office.   - We restart you on Eliquis today. Continue it twice daily.   - Wound care instruction: Do not lift left arm above shoulder or lift heavy items with left arm for 1 month. You can shower 2 days later.  Keep incision site clean and dry. Do not remove the glue on the incision. Let it fall off on its own.   - Call our doctor if any questions about the wound -- such as bleeding, swelling, increasing pain or redness.  [phone].

## 2022-04-08 NOTE — DISCHARGE NOTE PROVIDER - NSDCFUSCHEDAPPT_GEN_ALL_CORE_FT
MYRNA PAYNE ; 05/02/2022 ; [facility] [address]  MYRNA PAYNE ; 05/27/2022 ; [facility] [address] [facility]

## 2022-04-08 NOTE — DISCHARGE NOTE PROVIDER - NSDCCPCAREPLAN_GEN_ALL_CORE_FT
PRINCIPAL DISCHARGE DIAGNOSIS  Diagnosis: Ischemic cardiomyopathy  Assessment and Plan of Treatment:       SECONDARY DISCHARGE DIAGNOSES  Diagnosis: Bradycardia  Assessment and Plan of Treatment:

## 2022-04-08 NOTE — DISCHARGE NOTE PROVIDER - DETAILS OF MALNUTRITION DIAGNOSIS/DIAGNOSES
This patient has been assessed with a concern for Malnutrition and was treated during this hospitalization for the following Nutrition diagnosis/diagnoses:     -  04/06/2022: Severe protein-calorie malnutrition   -  04/06/2022: Underweight (BMI < 19)

## 2022-04-08 NOTE — DISCHARGE NOTE PROVIDER - CARE PROVIDERS DIRECT ADDRESSES
[email or web address]

## 2022-04-08 NOTE — DISCHARGE NOTE PROVIDER - HOSPITAL COURSE
82 yo M with HTN, HLD, DM, PVD, chronic atrial fibrillation (rate controlled, on Eliquis), CAD s/p CABG x 4 (LIMA to LAD 09/07 with revision 11/07 free LAURE-diagonal/LAD and saphenous vein grafts to OM1 and OM 3),  ischemic cardiomyopathy (EF: 23% 03/2022 ), bladder cancer (follows with Dr. Farley s/p BCG washes), who presented to [facility] ED on 4/01/22 with progressive dyspnea x 1 week. Of note, patient admitted to [facility] 03/02/22 for acute on chronic systolic HF exacerbation. Patient underwent IV diuresis, discharged home and then saw his cardiologist, Dr. Arriola who d/c Entresto and dec Lasix from 40 mg BID to Lasix 40 mg daily due to hyperkalemia and bradycardia. Patient states that since this, he has slowly developed more difficulty ambulating due to pronounced fatigue. Patient states that he is has trouble laying flat and is "unable to catch his breath." Patient's dyspnea was improved by sitting up in bed. During his hospital stay at [facility] from 04/01/22-04/04/22, patient underwent IV diuresis with Lasix.  He was transferred to [facility] on 4/4 for evaluation for device implant. Given low EF and need for chronic pacing due to bradycardia, pt underwent a BiV-ICD ([employer]) on 4/6/22.  His eliquis was restarted on 4/8. His tele has been with AFIB and biv Pacing at 75 bpm. Toprol was started.  No ACEi/ARB due to h/o hyperkalemia on entresto outpatient.  Will have his cardiologist DR. Arriola to reassess it outpatient.  No events. CXR showed leads in satisfactory positions. no Ptx.  device interrogation on 4/7 also showed good numbers.  He is tolerating wound pain. Still has some SOB with exertion but comfortable at rest with O2 sat 95-98% on room air today.  He is stable from EP standpoint for discharge home today.

## 2022-04-08 NOTE — DISCHARGE NOTE NURSING/CASE MANAGEMENT/SOCIAL WORK - NSDCPEFALRISK_GEN_ALL_CORE
For information on Fall & Injury Prevention, visit: [email or web address] OR  [email or web address] OR  https://www.cdc.gov/steadi/patient.html

## 2022-04-08 NOTE — DISCHARGE NOTE PROVIDER - NSDCMRMEDTOKEN_GEN_ALL_CORE_FT
aspirin 81 mg oral delayed release tablet: 1 tab(s) orally once a day  Centrum Men&#x27;s oral tablet: 1 tab(s) orally once a day  Eliquis 5 mg oral tablet: 1 tab(s) orally 2 times a day  ezetimibe 10 mg oral tablet: 1 tab(s) orally once a day  ferrous gluconate 240 mg (27 mg elemental iron) oral tablet: 1 tab(s) orally once a day  furosemide 40 mg oral tablet: 1 tab(s) orally once a day  magnesium oxide 400 mg oral tablet: 1 tab(s) orally once a day  metFORMIN 1000 mg oral tablet: 1 tab(s) orally 2 times a day  metoprolol succinate 25 mg oral tablet, extended release: 1 tab(s) orally once a day  *** new   Vitamin B12 1000 mcg oral tablet: 1 tab(s) orally once a day  Vitamin C 500 mg oral tablet: 1 tab(s) orally once a day

## 2022-04-08 NOTE — DISCHARGE NOTE PROVIDER - CARE PROVIDER_API CALL
Yesica Hager)  Cardiac Electrophysiology; Cardiovascular Disease; Internal Medicine  [address] [place] [address]  Phone: [phone]  Fax: [phone]  Follow Up Time:

## 2022-04-08 NOTE — DISCHARGE NOTE PROVIDER - NSDCCPTREATMENT_GEN_ALL_CORE_FT
PRINCIPAL PROCEDURE  Procedure: Insertion of biventricular implantable cardioverter-defibrillator (ICD)  Findings and Treatment:

## 2022-04-08 NOTE — DISCHARGE NOTE NURSING/CASE MANAGEMENT/SOCIAL WORK - PATIENT PORTAL LINK FT
You can access the FollowMyHealth Patient Portal offered by [facility] by registering at the following website: [email or web address]. By joining [employer]’s FollowMyHealth portal, you will also be able to view your health information using other applications (apps) compatible with our system.

## 2022-04-14 NOTE — DISCUSSION/SUMMARY
[FreeTextEntry1] : Mr. Freire is an 81 year-old male with HTN, HLD, PVD, permanent atrial fibrillation, CAD s/p CABG, and ischemic cardiomyopathy who can not be maintained on beta blocker due to symptomatic bradycardia.  As such I have offered him a pacemaker.  A biventricular pacemaker would be favored given the likely high percentage of RV pacing.  I also discussed the benefits of CRT-D versus CRT-P.  The family with discuss the merits of defibrillator versus pacemaker and contact me with their decision.

## 2022-04-14 NOTE — CARDIOLOGY SUMMARY
[de-identified] : atrial fibrillation with slow ventricular response, normal axis/intervals [de-identified] : EF 35%, mild aortic stenosis, moderate MR, moderate TR

## 2022-04-14 NOTE — HISTORY OF PRESENT ILLNESS
[FreeTextEntry1] : Mr. Freire is an 81 year-old male with HTN, HLD, PVD, permanent atrial fibrillation, CAD s/p CABG, and ischemic cardiomyopathy who was recently hospitalized with a CHF exacerbation.  Cardiac catheterization at that time revealed patent grafts and his EF was noted to be 23%.  He was started on beta blocker and Entresto for HF.  Unfortunately, these medications were discontinued due to bradycardia and hyperkalemia.  He continues to reports ROSENBERG along with fatigue.  He denies active chest pain, SOB at rest, palpitations, orthopnea, PND, and syncope.  He does note intermittent dizziness.  He has been compliant with his medication regimen.

## 2022-04-26 PROCEDURE — 71046 X-RAY EXAM CHEST 2 VIEWS: CPT

## 2022-04-26 PROCEDURE — 82962 GLUCOSE BLOOD TEST: CPT

## 2022-04-26 PROCEDURE — 80053 COMPREHEN METABOLIC PANEL: CPT

## 2022-04-26 PROCEDURE — 83880 ASSAY OF NATRIURETIC PEPTIDE: CPT

## 2022-04-26 PROCEDURE — 83540 ASSAY OF IRON: CPT

## 2022-04-26 PROCEDURE — C1900: CPT

## 2022-04-26 PROCEDURE — 80048 BASIC METABOLIC PNL TOTAL CA: CPT

## 2022-04-26 PROCEDURE — 84443 ASSAY THYROID STIM HORMONE: CPT

## 2022-04-26 PROCEDURE — 85610 PROTHROMBIN TIME: CPT

## 2022-04-26 PROCEDURE — 86901 BLOOD TYPING SEROLOGIC RH(D): CPT

## 2022-04-26 PROCEDURE — 85730 THROMBOPLASTIN TIME PARTIAL: CPT

## 2022-04-26 PROCEDURE — 83550 IRON BINDING TEST: CPT

## 2022-04-26 PROCEDURE — 86850 RBC ANTIBODY SCREEN: CPT

## 2022-04-26 PROCEDURE — 82607 VITAMIN B-12: CPT

## 2022-04-26 PROCEDURE — C1730: CPT

## 2022-04-26 PROCEDURE — C1887: CPT

## 2022-04-26 PROCEDURE — C1889: CPT

## 2022-04-26 PROCEDURE — 86900 BLOOD TYPING SEROLOGIC ABO: CPT

## 2022-04-26 PROCEDURE — C8929: CPT

## 2022-04-26 PROCEDURE — C1777: CPT

## 2022-04-26 PROCEDURE — 83735 ASSAY OF MAGNESIUM: CPT

## 2022-04-26 PROCEDURE — 85027 COMPLETE CBC AUTOMATED: CPT

## 2022-04-26 PROCEDURE — 82746 ASSAY OF FOLIC ACID SERUM: CPT

## 2022-04-26 PROCEDURE — C1882: CPT

## 2022-04-26 PROCEDURE — 83036 HEMOGLOBIN GLYCOSYLATED A1C: CPT

## 2022-04-26 PROCEDURE — 85025 COMPLETE CBC W/AUTO DIFF WBC: CPT

## 2022-04-26 PROCEDURE — 82728 ASSAY OF FERRITIN: CPT

## 2022-04-26 PROCEDURE — C1769: CPT

## 2022-04-26 PROCEDURE — 80061 LIPID PANEL: CPT

## 2022-04-26 PROCEDURE — 87635 SARS-COV-2 COVID-19 AMP PRB: CPT

## 2022-04-26 PROCEDURE — 36415 COLL VENOUS BLD VENIPUNCTURE: CPT

## 2022-05-02 ENCOUNTER — APPOINTMENT (OUTPATIENT)
Dept: CARDIOLOGY | Facility: CLINIC | Age: 82
End: 2022-05-02

## 2022-05-03 ENCOUNTER — TRANSCRIPTION ENCOUNTER (OUTPATIENT)
Age: 82
End: 2022-05-03

## 2022-05-20 ENCOUNTER — APPOINTMENT (OUTPATIENT)
Dept: HEART AND VASCULAR | Facility: CLINIC | Age: 82
End: 2022-05-20

## 2022-05-27 ENCOUNTER — APPOINTMENT (OUTPATIENT)
Dept: HEART AND VASCULAR | Facility: CLINIC | Age: 82
End: 2022-05-27

## 2023-01-18 NOTE — H&P ADULT - SKIN
----- Message from Phill Mancilla PA-C sent at 1/17/2023  4:34 PM CST -----  Please inform patient of KUB showing moderate stool in the ascending colon and transverse colon. Possible inflammation of the intestines were noted although the findings were nonspecific. Will follow up with lab results shortly. Patient underwent Colonoscopy today 1/17/23. Pathology in process.   No lesions; no rash

## 2023-03-21 NOTE — PATIENT PROFILE ADULT - FUNCTIONAL ASSESSMENT - BASIC MOBILITY 1.
Patient arrives as a walk in with c/o chest pain that started yesterday around 2100 and has had neck pain that started 3 days ago. Patient states vision not as clear as normal. Last took aleve at 1900 with no relief    4 = No assist / stand by assistance

## 2023-05-17 NOTE — H&P ADULT - WEIGHT IN KG
Oculoplastic Surgeon Procedure Text (A): After obtaining clear surgical margins the patient was sent to oculoplastics for surgical repair.  The patient understands they will receive post-surgical care and follow-up from the referring physician's office. 62.7